# Patient Record
Sex: FEMALE | Race: WHITE | NOT HISPANIC OR LATINO
[De-identification: names, ages, dates, MRNs, and addresses within clinical notes are randomized per-mention and may not be internally consistent; named-entity substitution may affect disease eponyms.]

---

## 2017-01-27 ENCOUNTER — APPOINTMENT (OUTPATIENT)
Dept: INTERNAL MEDICINE | Facility: CLINIC | Age: 67
End: 2017-01-27

## 2017-01-27 ENCOUNTER — NON-APPOINTMENT (OUTPATIENT)
Age: 67
End: 2017-01-27

## 2017-01-27 VITALS
SYSTOLIC BLOOD PRESSURE: 160 MMHG | HEART RATE: 71 BPM | HEIGHT: 65.5 IN | RESPIRATION RATE: 14 BRPM | DIASTOLIC BLOOD PRESSURE: 80 MMHG | OXYGEN SATURATION: 99 %

## 2017-01-27 VITALS — DIASTOLIC BLOOD PRESSURE: 80 MMHG | SYSTOLIC BLOOD PRESSURE: 140 MMHG

## 2017-01-27 DIAGNOSIS — I49.9 CARDIAC ARRHYTHMIA, UNSPECIFIED: ICD-10-CM

## 2017-02-02 ENCOUNTER — NON-APPOINTMENT (OUTPATIENT)
Age: 67
End: 2017-02-02

## 2017-02-02 ENCOUNTER — APPOINTMENT (OUTPATIENT)
Dept: CARDIOLOGY | Facility: CLINIC | Age: 67
End: 2017-02-02

## 2017-02-02 VITALS
WEIGHT: 120 LBS | HEIGHT: 65 IN | SYSTOLIC BLOOD PRESSURE: 175 MMHG | HEART RATE: 75 BPM | DIASTOLIC BLOOD PRESSURE: 80 MMHG | BODY MASS INDEX: 19.99 KG/M2 | OXYGEN SATURATION: 100 %

## 2017-04-17 ENCOUNTER — APPOINTMENT (OUTPATIENT)
Dept: INTERNAL MEDICINE | Facility: CLINIC | Age: 67
End: 2017-04-17

## 2017-04-17 VITALS — SYSTOLIC BLOOD PRESSURE: 130 MMHG | DIASTOLIC BLOOD PRESSURE: 74 MMHG

## 2017-04-17 VITALS
OXYGEN SATURATION: 98 % | TEMPERATURE: 98.3 F | HEIGHT: 65 IN | RESPIRATION RATE: 14 BRPM | DIASTOLIC BLOOD PRESSURE: 68 MMHG | HEART RATE: 76 BPM | SYSTOLIC BLOOD PRESSURE: 138 MMHG

## 2017-05-31 ENCOUNTER — APPOINTMENT (OUTPATIENT)
Dept: MRI IMAGING | Facility: CLINIC | Age: 67
End: 2017-05-31

## 2017-05-31 ENCOUNTER — OUTPATIENT (OUTPATIENT)
Dept: OUTPATIENT SERVICES | Facility: HOSPITAL | Age: 67
LOS: 1 days | End: 2017-05-31
Payer: MEDICARE

## 2017-05-31 ENCOUNTER — APPOINTMENT (OUTPATIENT)
Dept: SPINE | Facility: CLINIC | Age: 67
End: 2017-05-31

## 2017-05-31 VITALS
BODY MASS INDEX: 19.75 KG/M2 | HEART RATE: 71 BPM | HEIGHT: 65.5 IN | WEIGHT: 120 LBS | SYSTOLIC BLOOD PRESSURE: 123 MMHG | DIASTOLIC BLOOD PRESSURE: 82 MMHG

## 2017-05-31 DIAGNOSIS — Z86.79 PERSONAL HISTORY OF OTHER DISEASES OF THE CIRCULATORY SYSTEM: ICD-10-CM

## 2017-05-31 DIAGNOSIS — M51.26 OTHER INTERVERTEBRAL DISC DISPLACEMENT, LUMBAR REGION: ICD-10-CM

## 2017-05-31 DIAGNOSIS — Z82.61 FAMILY HISTORY OF ARTHRITIS: ICD-10-CM

## 2017-05-31 DIAGNOSIS — Z78.9 OTHER SPECIFIED HEALTH STATUS: ICD-10-CM

## 2017-05-31 PROCEDURE — 72148 MRI LUMBAR SPINE W/O DYE: CPT

## 2017-05-31 RX ORDER — TRAMADOL HYDROCHLORIDE 50 MG/1
50 TABLET, COATED ORAL
Qty: 30 | Refills: 0 | Status: COMPLETED | COMMUNITY
Start: 2017-01-27 | End: 2017-05-31

## 2017-06-07 ENCOUNTER — APPOINTMENT (OUTPATIENT)
Dept: SPINE | Facility: CLINIC | Age: 67
End: 2017-06-07

## 2017-06-07 VITALS
HEART RATE: 83 BPM | SYSTOLIC BLOOD PRESSURE: 137 MMHG | HEIGHT: 65.5 IN | DIASTOLIC BLOOD PRESSURE: 85 MMHG | WEIGHT: 120 LBS | BODY MASS INDEX: 19.75 KG/M2

## 2017-06-07 DIAGNOSIS — M54.16 RADICULOPATHY, LUMBAR REGION: ICD-10-CM

## 2017-06-12 ENCOUNTER — APPOINTMENT (OUTPATIENT)
Dept: SPINE | Facility: CLINIC | Age: 67
End: 2017-06-12

## 2017-06-12 VITALS
HEART RATE: 71 BPM | WEIGHT: 120 LBS | BODY MASS INDEX: 19.75 KG/M2 | SYSTOLIC BLOOD PRESSURE: 112 MMHG | HEIGHT: 65.5 IN | DIASTOLIC BLOOD PRESSURE: 74 MMHG

## 2017-06-12 DIAGNOSIS — M54.16 RADICULOPATHY, LUMBAR REGION: ICD-10-CM

## 2017-06-15 ENCOUNTER — APPOINTMENT (OUTPATIENT)
Dept: RADIOLOGY | Facility: CLINIC | Age: 67
End: 2017-06-15

## 2017-06-15 ENCOUNTER — APPOINTMENT (OUTPATIENT)
Dept: CT IMAGING | Facility: CLINIC | Age: 67
End: 2017-06-15

## 2017-06-15 ENCOUNTER — OUTPATIENT (OUTPATIENT)
Dept: OUTPATIENT SERVICES | Facility: HOSPITAL | Age: 67
LOS: 1 days | End: 2017-06-15
Payer: MEDICARE

## 2017-06-15 DIAGNOSIS — M51.26 OTHER INTERVERTEBRAL DISC DISPLACEMENT, LUMBAR REGION: ICD-10-CM

## 2017-06-15 PROCEDURE — 72082 X-RAY EXAM ENTIRE SPI 2/3 VW: CPT

## 2017-06-15 PROCEDURE — 72131 CT LUMBAR SPINE W/O DYE: CPT

## 2017-06-26 ENCOUNTER — APPOINTMENT (OUTPATIENT)
Dept: SPINE | Facility: CLINIC | Age: 67
End: 2017-06-26

## 2017-06-26 VITALS
HEART RATE: 62 BPM | BODY MASS INDEX: 20.83 KG/M2 | HEIGHT: 65 IN | SYSTOLIC BLOOD PRESSURE: 154 MMHG | WEIGHT: 125 LBS | DIASTOLIC BLOOD PRESSURE: 89 MMHG

## 2017-07-10 ENCOUNTER — APPOINTMENT (OUTPATIENT)
Dept: INTERNAL MEDICINE | Facility: CLINIC | Age: 67
End: 2017-07-10

## 2017-07-10 ENCOUNTER — FORM ENCOUNTER (OUTPATIENT)
Age: 67
End: 2017-07-10

## 2017-07-10 VITALS
HEART RATE: 73 BPM | BODY MASS INDEX: 20.83 KG/M2 | WEIGHT: 125 LBS | RESPIRATION RATE: 14 BRPM | OXYGEN SATURATION: 98 % | DIASTOLIC BLOOD PRESSURE: 82 MMHG | HEIGHT: 65 IN | SYSTOLIC BLOOD PRESSURE: 128 MMHG | TEMPERATURE: 98 F

## 2017-07-10 DIAGNOSIS — M79.606 PAIN IN LEG, UNSPECIFIED: ICD-10-CM

## 2017-07-11 ENCOUNTER — OUTPATIENT (OUTPATIENT)
Dept: OUTPATIENT SERVICES | Facility: HOSPITAL | Age: 67
LOS: 1 days | End: 2017-07-11
Payer: MEDICARE

## 2017-07-11 ENCOUNTER — APPOINTMENT (OUTPATIENT)
Dept: RADIOLOGY | Facility: CLINIC | Age: 67
End: 2017-07-11

## 2017-07-11 DIAGNOSIS — M79.606 PAIN IN LEG, UNSPECIFIED: ICD-10-CM

## 2017-07-11 PROCEDURE — 73502 X-RAY EXAM HIP UNI 2-3 VIEWS: CPT

## 2017-07-11 PROCEDURE — 73502 X-RAY EXAM HIP UNI 2-3 VIEWS: CPT | Mod: 26,RT

## 2017-07-11 PROCEDURE — 73552 X-RAY EXAM OF FEMUR 2/>: CPT

## 2017-07-11 PROCEDURE — 73552 X-RAY EXAM OF FEMUR 2/>: CPT | Mod: 26,RT

## 2017-07-14 ENCOUNTER — OTHER (OUTPATIENT)
Age: 67
End: 2017-07-14

## 2017-08-09 ENCOUNTER — APPOINTMENT (OUTPATIENT)
Dept: INTERNAL MEDICINE | Facility: CLINIC | Age: 67
End: 2017-08-09
Payer: COMMERCIAL

## 2017-08-09 VITALS
SYSTOLIC BLOOD PRESSURE: 130 MMHG | WEIGHT: 121 LBS | HEART RATE: 64 BPM | BODY MASS INDEX: 20.16 KG/M2 | HEIGHT: 65 IN | DIASTOLIC BLOOD PRESSURE: 70 MMHG | OXYGEN SATURATION: 99 % | TEMPERATURE: 98.1 F | RESPIRATION RATE: 14 BRPM
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
## 2017-08-09 DIAGNOSIS — M79.606 PAIN IN LEG, UNSPECIFIED: ICD-10-CM

## 2017-08-09 PROCEDURE — 99214 OFFICE O/P EST MOD 30 MIN: CPT

## 2017-09-12 ENCOUNTER — OTHER (OUTPATIENT)
Age: 67
End: 2017-09-12

## 2017-09-13 ENCOUNTER — OTHER (OUTPATIENT)
Age: 67
End: 2017-09-13

## 2017-10-09 ENCOUNTER — APPOINTMENT (OUTPATIENT)
Dept: INTERNAL MEDICINE | Facility: CLINIC | Age: 67
End: 2017-10-09
Payer: MEDICARE

## 2017-10-09 VITALS — SYSTOLIC BLOOD PRESSURE: 130 MMHG | DIASTOLIC BLOOD PRESSURE: 70 MMHG

## 2017-10-09 VITALS
HEART RATE: 78 BPM | BODY MASS INDEX: 20.16 KG/M2 | DIASTOLIC BLOOD PRESSURE: 80 MMHG | HEIGHT: 65 IN | SYSTOLIC BLOOD PRESSURE: 140 MMHG | WEIGHT: 121 LBS | RESPIRATION RATE: 14 BRPM | TEMPERATURE: 98.2 F | OXYGEN SATURATION: 97 %

## 2017-10-09 PROCEDURE — 90686 IIV4 VACC NO PRSV 0.5 ML IM: CPT

## 2017-10-09 PROCEDURE — 99214 OFFICE O/P EST MOD 30 MIN: CPT | Mod: 25

## 2017-10-09 PROCEDURE — G0008: CPT

## 2017-10-30 ENCOUNTER — APPOINTMENT (OUTPATIENT)
Dept: INTERNAL MEDICINE | Facility: CLINIC | Age: 67
End: 2017-10-30
Payer: MEDICARE

## 2017-10-30 VITALS
TEMPERATURE: 97.8 F | OXYGEN SATURATION: 98 % | RESPIRATION RATE: 14 BRPM | HEIGHT: 65 IN | DIASTOLIC BLOOD PRESSURE: 66 MMHG | HEART RATE: 91 BPM | BODY MASS INDEX: 20.16 KG/M2 | SYSTOLIC BLOOD PRESSURE: 116 MMHG | WEIGHT: 121 LBS

## 2017-10-30 DIAGNOSIS — M25.559 PAIN IN UNSPECIFIED HIP: ICD-10-CM

## 2017-10-30 PROCEDURE — 99214 OFFICE O/P EST MOD 30 MIN: CPT | Mod: 25

## 2017-10-30 PROCEDURE — 90736 HZV VACCINE LIVE SUBQ: CPT | Mod: GY

## 2017-10-30 PROCEDURE — 90471 IMMUNIZATION ADMIN: CPT

## 2017-11-04 ENCOUNTER — FORM ENCOUNTER (OUTPATIENT)
Age: 67
End: 2017-11-04

## 2017-11-05 ENCOUNTER — OUTPATIENT (OUTPATIENT)
Dept: OUTPATIENT SERVICES | Facility: HOSPITAL | Age: 67
LOS: 1 days | End: 2017-11-05
Payer: COMMERCIAL

## 2017-11-05 ENCOUNTER — APPOINTMENT (OUTPATIENT)
Dept: MRI IMAGING | Facility: CLINIC | Age: 67
End: 2017-11-05
Payer: MEDICARE

## 2017-11-05 DIAGNOSIS — M25.559 PAIN IN UNSPECIFIED HIP: ICD-10-CM

## 2017-11-05 PROCEDURE — 72148 MRI LUMBAR SPINE W/O DYE: CPT | Mod: 26

## 2017-11-05 PROCEDURE — 72195 MRI PELVIS W/O DYE: CPT | Mod: 26

## 2017-11-05 PROCEDURE — 72148 MRI LUMBAR SPINE W/O DYE: CPT

## 2017-11-05 PROCEDURE — 72195 MRI PELVIS W/O DYE: CPT

## 2017-11-07 ENCOUNTER — OTHER (OUTPATIENT)
Age: 67
End: 2017-11-07

## 2017-11-08 DIAGNOSIS — M51.36 OTHER INTERVERTEBRAL DISC DEGENERATION, LUMBAR REGION: ICD-10-CM

## 2017-11-08 DIAGNOSIS — M25.451 EFFUSION, RIGHT HIP: ICD-10-CM

## 2017-11-08 DIAGNOSIS — M25.551 PAIN IN RIGHT HIP: ICD-10-CM

## 2017-11-08 DIAGNOSIS — M51.27 OTHER INTERVERTEBRAL DISC DISPLACEMENT, LUMBOSACRAL REGION: ICD-10-CM

## 2017-12-05 ENCOUNTER — EMERGENCY (EMERGENCY)
Facility: HOSPITAL | Age: 67
LOS: 1 days | Discharge: HOME | End: 2017-12-05
Attending: EMERGENCY MEDICINE | Admitting: EMERGENCY MEDICINE
Payer: MEDICARE

## 2017-12-05 VITALS — WEIGHT: 123.9 LBS | HEIGHT: 65 IN

## 2017-12-05 VITALS
HEART RATE: 68 BPM | OXYGEN SATURATION: 98 % | SYSTOLIC BLOOD PRESSURE: 130 MMHG | RESPIRATION RATE: 16 BRPM | DIASTOLIC BLOOD PRESSURE: 77 MMHG

## 2017-12-05 DIAGNOSIS — Z96.643 PRESENCE OF ARTIFICIAL HIP JOINT, BILATERAL: Chronic | ICD-10-CM

## 2017-12-05 LAB
ALBUMIN SERPL ELPH-MCNC: 3.7 G/DL — SIGNIFICANT CHANGE UP (ref 3.3–5)
ALP SERPL-CCNC: 79 U/L — SIGNIFICANT CHANGE UP (ref 40–120)
ALT FLD-CCNC: 44 U/L — SIGNIFICANT CHANGE UP (ref 12–78)
AMYLASE P1 CFR SERPL: 177 U/L — HIGH (ref 25–115)
ANION GAP SERPL CALC-SCNC: 7 MMOL/L — SIGNIFICANT CHANGE UP (ref 5–17)
APPEARANCE UR: CLEAR — SIGNIFICANT CHANGE UP
AST SERPL-CCNC: 34 U/L — SIGNIFICANT CHANGE UP (ref 15–37)
BILIRUB SERPL-MCNC: 0.5 MG/DL — SIGNIFICANT CHANGE UP (ref 0.2–1.2)
BILIRUB UR-MCNC: NEGATIVE — SIGNIFICANT CHANGE UP
BUN SERPL-MCNC: 32 MG/DL — HIGH (ref 7–23)
CALCIUM SERPL-MCNC: 8.6 MG/DL — SIGNIFICANT CHANGE UP (ref 8.5–10.1)
CHLORIDE SERPL-SCNC: 104 MMOL/L — SIGNIFICANT CHANGE UP (ref 96–108)
CO2 SERPL-SCNC: 27 MMOL/L — SIGNIFICANT CHANGE UP (ref 22–31)
COLOR SPEC: YELLOW — SIGNIFICANT CHANGE UP
CREAT SERPL-MCNC: 1.5 MG/DL — HIGH (ref 0.5–1.3)
DIFF PNL FLD: ABNORMAL
GLUCOSE SERPL-MCNC: 121 MG/DL — HIGH (ref 70–99)
GLUCOSE UR QL: NEGATIVE — SIGNIFICANT CHANGE UP
HCT VFR BLD CALC: 35.7 % — SIGNIFICANT CHANGE UP (ref 34.5–45)
HGB BLD-MCNC: 11.5 G/DL — SIGNIFICANT CHANGE UP (ref 11.5–15.5)
KETONES UR-MCNC: NEGATIVE — SIGNIFICANT CHANGE UP
LEUKOCYTE ESTERASE UR-ACNC: ABNORMAL
LIDOCAIN IGE QN: 254 U/L — SIGNIFICANT CHANGE UP (ref 73–393)
MCHC RBC-ENTMCNC: 31.3 PG — SIGNIFICANT CHANGE UP (ref 27–34)
MCHC RBC-ENTMCNC: 32.2 GM/DL — SIGNIFICANT CHANGE UP (ref 32–36)
MCV RBC AUTO: 97.2 FL — SIGNIFICANT CHANGE UP (ref 80–100)
NITRITE UR-MCNC: NEGATIVE — SIGNIFICANT CHANGE UP
PH UR: 5 — SIGNIFICANT CHANGE UP (ref 5–8)
PLATELET # BLD AUTO: 189 K/UL — SIGNIFICANT CHANGE UP (ref 150–400)
POTASSIUM SERPL-MCNC: 4 MMOL/L — SIGNIFICANT CHANGE UP (ref 3.5–5.3)
POTASSIUM SERPL-SCNC: 4 MMOL/L — SIGNIFICANT CHANGE UP (ref 3.5–5.3)
PROT SERPL-MCNC: 6.9 G/DL — SIGNIFICANT CHANGE UP (ref 6–8.3)
PROT UR-MCNC: NEGATIVE — SIGNIFICANT CHANGE UP
RBC # BLD: 3.67 M/UL — LOW (ref 3.8–5.2)
RBC # FLD: 11 % — SIGNIFICANT CHANGE UP (ref 10.3–14.5)
SODIUM SERPL-SCNC: 138 MMOL/L — SIGNIFICANT CHANGE UP (ref 135–145)
SP GR SPEC: 1.01 — SIGNIFICANT CHANGE UP (ref 1.01–1.02)
UROBILINOGEN FLD QL: NEGATIVE — SIGNIFICANT CHANGE UP
WBC # BLD: 7.8 K/UL — SIGNIFICANT CHANGE UP (ref 3.8–10.5)
WBC # FLD AUTO: 7.8 K/UL — SIGNIFICANT CHANGE UP (ref 3.8–10.5)

## 2017-12-05 PROCEDURE — 80053 COMPREHEN METABOLIC PANEL: CPT

## 2017-12-05 PROCEDURE — 70450 CT HEAD/BRAIN W/O DYE: CPT | Mod: 26

## 2017-12-05 PROCEDURE — 74176 CT ABD & PELVIS W/O CONTRAST: CPT | Mod: 26

## 2017-12-05 PROCEDURE — 99285 EMERGENCY DEPT VISIT HI MDM: CPT

## 2017-12-05 PROCEDURE — 81001 URINALYSIS AUTO W/SCOPE: CPT

## 2017-12-05 PROCEDURE — 70450 CT HEAD/BRAIN W/O DYE: CPT

## 2017-12-05 PROCEDURE — 85027 COMPLETE CBC AUTOMATED: CPT

## 2017-12-05 PROCEDURE — 83690 ASSAY OF LIPASE: CPT

## 2017-12-05 PROCEDURE — 82150 ASSAY OF AMYLASE: CPT

## 2017-12-05 PROCEDURE — 93005 ELECTROCARDIOGRAM TRACING: CPT

## 2017-12-05 PROCEDURE — 74176 CT ABD & PELVIS W/O CONTRAST: CPT

## 2017-12-05 PROCEDURE — 99284 EMERGENCY DEPT VISIT MOD MDM: CPT | Mod: 25

## 2017-12-05 RX ORDER — LOSARTAN POTASSIUM 100 MG/1
1 TABLET, FILM COATED ORAL
Qty: 0 | Refills: 0 | COMMUNITY

## 2017-12-05 RX ORDER — IOHEXOL 300 MG/ML
30 INJECTION, SOLUTION INTRAVENOUS ONCE
Qty: 0 | Refills: 0 | Status: COMPLETED | OUTPATIENT
Start: 2017-12-05 | End: 2017-12-05

## 2017-12-05 RX ORDER — SODIUM CHLORIDE 9 MG/ML
1000 INJECTION INTRAMUSCULAR; INTRAVENOUS; SUBCUTANEOUS ONCE
Qty: 0 | Refills: 0 | Status: COMPLETED | OUTPATIENT
Start: 2017-12-05 | End: 2017-12-05

## 2017-12-05 RX ADMIN — IOHEXOL 30 MILLILITER(S): 300 INJECTION, SOLUTION INTRAVENOUS at 12:21

## 2017-12-05 RX ADMIN — SODIUM CHLORIDE 2000 MILLILITER(S): 9 INJECTION INTRAMUSCULAR; INTRAVENOUS; SUBCUTANEOUS at 10:47

## 2017-12-05 NOTE — ED ADULT NURSE NOTE - CADM POA CENTRAL LINE
Alert & oriented; no sensory, motor or coordination deficits, normal reflexes No negative detailed exam

## 2017-12-05 NOTE — ED PROVIDER NOTE - MEDICAL DECISION MAKING DETAILS
Abdominal cramps followed by nausea/dizziness and syncope requiring evaluation, labs and ct scan as well as ecg and cardiology consultation with Dr. Lozada

## 2017-12-05 NOTE — ED PROVIDER NOTE - OBJECTIVE STATEMENT
68 yo F PMHx HTN, arthritis presents after syncopal episode at home. Pt's daughter and son-in-law are at bedside who provide history due to witnessing events and patient not being able to recall events. As per pt's daughter, she found her mother "passed out" in the bedroom, with pale skin. Pt's daughter went to get help and told her mother to stay in bed. When pt's other daughter and son-law returned to the bedroom, they found her mother slouched over, pale, diaphoretic, and "convulsing" on the toilet. Son in law then picked up the pt and she regained consciousness about 30 seconds later. Pt states that the last events that she remembers is not feeling well after taking a shower this morning. Pt states that she had upper abdominal pain and dizziness and felt like she might "pass out." After the event, pt stated that she felt nauseous. Denies previous similar episodes. Reports the upper abdominal pain being present for the past week. Denies fevers, chills, changes in stool coloring, constipation, diarrhea, vomiting, numbness, tingling, headache, CP, palpitations, SOB. Pt had last colonoscopy performed 5 years ago and was told she has polyps. Pt takes Tylenol frequently for her arthritis.

## 2017-12-05 NOTE — CONSULT NOTE ADULT - ASSESSMENT
Pt is a 66 y/o f pmhx arthritis of the back and HTN presents to the ED after two episodes of syncope this morning possibly vasovagal in nature 2/2 pain from pancreatitis.     Recommend:    - Unlikely cardiac in nature with normal EKG, asymptomatic  - No evidence of acute ischemia  - No evidence of volume overload  - Elevated amylase, f/u CT abdomen and pelvis  - May need ECHO  - BP well controlled with home Losartan, however in the setting of KAYY will need to hold for now, should also hold NSAIDS  - Monitor and replete lytes, keep K>4, Mg<2  - Other cardiovascular workup will depend on clinical course  - All other workup as per primary team  - Will follow Pt is a 66 y/o f pmhx arthritis of the back and HTN presents to the ED after two episodes of syncope this morning possibly vasovagal in nature 2/2 pain from pancreatitis.     Recommend:    - Unlikely cardiac in nature with normal EKG, asymptomatic  - No evidence of acute ischemia  - No evidence of volume overload  - Elevated amylase, f/u CT abdomen and pelvis  - BP well controlled with home Losartan, however in the setting of KAYY will need to hold for now, should also hold NSAIDS  - Monitor and replete lytes, keep K>4, Mg<2  - Should f/u with Dr. Singh for outpatient ECHO and carotid dopplers  - All other workup as per primary team  - Pt is cleared for discharge from a cardiac standpoint Pt is a 66 y/o f pmhx arthritis of the back and HTN presents to the ED after two episodes of syncope this morning possibly vasovagal in nature 2/2 pain from pancreatitis.     Recommend:    - syncope seems vagal.    - No evidence of acute ischemia  - No evidence of volume overload  - Elevated amylase, f/u CT abdomen and pelvis  - BP well controlled with home Losartan, however in the setting of KAYY will need to hold for now, should also hold NSAIDS  - Monitor and replete lytes, keep K>4, Mg<2  - Should f/u with Dr. Singh for outpatient ECHO and carotid dopplers  - All other workup as per primary team  - Pt is cleared for discharge from a cardiac standpoint

## 2017-12-05 NOTE — ED PROVIDER NOTE - FAMILY HISTORY
Father  Still living? Unknown  Family history of kidney cancer, Age at diagnosis: Age Unknown     Mother  Still living? Unknown  Family history of kidney cancer, Age at diagnosis: Age Unknown

## 2017-12-05 NOTE — ED PROVIDER NOTE - ATTENDING CONTRIBUTION TO CARE
Mid abdominal pain with nausea, dizziness and then syncope. Exam revealed female in NAD with normal lungs and heart but with mild epigastric tenderness to deep palpation. I agree with plan and management outlined by R-1.

## 2017-12-08 ENCOUNTER — APPOINTMENT (OUTPATIENT)
Dept: INTERNAL MEDICINE | Facility: CLINIC | Age: 67
End: 2017-12-08
Payer: MEDICARE

## 2017-12-08 VITALS
RESPIRATION RATE: 14 BRPM | HEIGHT: 65 IN | SYSTOLIC BLOOD PRESSURE: 138 MMHG | DIASTOLIC BLOOD PRESSURE: 82 MMHG | TEMPERATURE: 97.8 F | HEART RATE: 82 BPM | WEIGHT: 119 LBS | OXYGEN SATURATION: 98 % | BODY MASS INDEX: 19.83 KG/M2

## 2017-12-08 DIAGNOSIS — R10.84 GENERALIZED ABDOMINAL PAIN: ICD-10-CM

## 2017-12-08 LAB
AMYLASE/CREAT SERPL: 106 U/L
LPL SERPL-CCNC: 62 U/L

## 2017-12-08 PROCEDURE — 99214 OFFICE O/P EST MOD 30 MIN: CPT | Mod: 25

## 2017-12-08 PROCEDURE — 36415 COLL VENOUS BLD VENIPUNCTURE: CPT

## 2017-12-09 LAB
ALBUMIN SERPL ELPH-MCNC: 4.3 G/DL
ALP BLD-CCNC: 76 U/L
ALT SERPL-CCNC: 29 U/L
ANION GAP SERPL CALC-SCNC: 15 MMOL/L
AST SERPL-CCNC: 28 U/L
BASOPHILS # BLD AUTO: 0.04 K/UL
BASOPHILS NFR BLD AUTO: 0.5 %
BILIRUB SERPL-MCNC: 0.4 MG/DL
BUN SERPL-MCNC: 25 MG/DL
CALCIUM SERPL-MCNC: 9.9 MG/DL
CHLORIDE SERPL-SCNC: 100 MMOL/L
CO2 SERPL-SCNC: 23 MMOL/L
CREAT SERPL-MCNC: 1.28 MG/DL
EOSINOPHIL # BLD AUTO: 0.27 K/UL
EOSINOPHIL NFR BLD AUTO: 3.3 %
GLUCOSE SERPL-MCNC: 83 MG/DL
HCT VFR BLD CALC: 34.1 %
HGB BLD-MCNC: 11.3 G/DL
IMM GRANULOCYTES NFR BLD AUTO: 0 %
LYMPHOCYTES # BLD AUTO: 2.28 K/UL
LYMPHOCYTES NFR BLD AUTO: 27.9 %
MAN DIFF?: NORMAL
MCHC RBC-ENTMCNC: 31.7 PG
MCHC RBC-ENTMCNC: 33.1 GM/DL
MCV RBC AUTO: 95.8 FL
MONOCYTES # BLD AUTO: 0.71 K/UL
MONOCYTES NFR BLD AUTO: 8.7 %
NEUTROPHILS # BLD AUTO: 4.87 K/UL
NEUTROPHILS NFR BLD AUTO: 59.6 %
PLATELET # BLD AUTO: 216 K/UL
POTASSIUM SERPL-SCNC: 5 MMOL/L
PROT SERPL-MCNC: 7.9 G/DL
RBC # BLD: 3.56 M/UL
RBC # FLD: 12.4 %
SODIUM SERPL-SCNC: 138 MMOL/L
WBC # FLD AUTO: 8.17 K/UL

## 2017-12-11 ENCOUNTER — FORM ENCOUNTER (OUTPATIENT)
Age: 67
End: 2017-12-11

## 2017-12-11 ENCOUNTER — TRANSCRIPTION ENCOUNTER (OUTPATIENT)
Age: 67
End: 2017-12-11

## 2017-12-12 ENCOUNTER — OUTPATIENT (OUTPATIENT)
Dept: OUTPATIENT SERVICES | Facility: HOSPITAL | Age: 67
LOS: 1 days | End: 2017-12-12
Payer: MEDICARE

## 2017-12-12 ENCOUNTER — APPOINTMENT (OUTPATIENT)
Dept: CT IMAGING | Facility: CLINIC | Age: 67
End: 2017-12-12
Payer: MEDICARE

## 2017-12-12 DIAGNOSIS — Z96.643 PRESENCE OF ARTIFICIAL HIP JOINT, BILATERAL: Chronic | ICD-10-CM

## 2017-12-12 DIAGNOSIS — Z00.8 ENCOUNTER FOR OTHER GENERAL EXAMINATION: ICD-10-CM

## 2017-12-12 PROCEDURE — 74177 CT ABD & PELVIS W/CONTRAST: CPT | Mod: 26

## 2017-12-12 PROCEDURE — 74177 CT ABD & PELVIS W/CONTRAST: CPT

## 2017-12-29 ENCOUNTER — CHART COPY (OUTPATIENT)
Age: 67
End: 2017-12-29

## 2018-03-15 ENCOUNTER — RX RENEWAL (OUTPATIENT)
Age: 68
End: 2018-03-15

## 2018-03-30 ENCOUNTER — APPOINTMENT (OUTPATIENT)
Dept: INTERNAL MEDICINE | Facility: CLINIC | Age: 68
End: 2018-03-30
Payer: MEDICARE

## 2018-03-30 VITALS
OXYGEN SATURATION: 98 % | HEART RATE: 75 BPM | DIASTOLIC BLOOD PRESSURE: 80 MMHG | RESPIRATION RATE: 14 BRPM | HEIGHT: 65 IN | TEMPERATURE: 98.3 F | SYSTOLIC BLOOD PRESSURE: 130 MMHG | WEIGHT: 124 LBS | BODY MASS INDEX: 20.66 KG/M2

## 2018-03-30 PROCEDURE — 99214 OFFICE O/P EST MOD 30 MIN: CPT

## 2018-04-23 ENCOUNTER — RX RENEWAL (OUTPATIENT)
Age: 68
End: 2018-04-23

## 2018-06-12 ENCOUNTER — RX RENEWAL (OUTPATIENT)
Age: 68
End: 2018-06-12

## 2018-09-07 PROBLEM — I10 ESSENTIAL (PRIMARY) HYPERTENSION: Chronic | Status: ACTIVE | Noted: 2017-12-05

## 2018-09-07 PROBLEM — M19.90 UNSPECIFIED OSTEOARTHRITIS, UNSPECIFIED SITE: Chronic | Status: ACTIVE | Noted: 2017-12-05

## 2018-09-10 ENCOUNTER — APPOINTMENT (OUTPATIENT)
Dept: INTERNAL MEDICINE | Facility: CLINIC | Age: 68
End: 2018-09-10
Payer: MEDICARE

## 2018-09-10 VITALS
BODY MASS INDEX: 20.16 KG/M2 | DIASTOLIC BLOOD PRESSURE: 70 MMHG | WEIGHT: 121 LBS | HEART RATE: 63 BPM | HEIGHT: 65 IN | OXYGEN SATURATION: 98 % | RESPIRATION RATE: 14 BRPM | SYSTOLIC BLOOD PRESSURE: 140 MMHG | TEMPERATURE: 98.1 F

## 2018-09-10 VITALS — SYSTOLIC BLOOD PRESSURE: 136 MMHG | DIASTOLIC BLOOD PRESSURE: 80 MMHG

## 2018-09-10 PROCEDURE — 99214 OFFICE O/P EST MOD 30 MIN: CPT | Mod: 25

## 2018-09-10 PROCEDURE — 36415 COLL VENOUS BLD VENIPUNCTURE: CPT

## 2018-09-10 RX ORDER — AMOXICILLIN AND CLAVULANATE POTASSIUM 875; 125 MG/1; MG/1
875-125 TABLET, COATED ORAL
Qty: 20 | Refills: 0 | Status: DISCONTINUED | COMMUNITY
Start: 2017-12-08 | End: 2018-09-10

## 2018-09-10 NOTE — PHYSICAL EXAM

## 2018-10-12 ENCOUNTER — APPOINTMENT (OUTPATIENT)
Dept: INTERNAL MEDICINE | Facility: CLINIC | Age: 68
End: 2018-10-12
Payer: MEDICARE

## 2018-10-12 VITALS
TEMPERATURE: 98.6 F | DIASTOLIC BLOOD PRESSURE: 80 MMHG | HEART RATE: 65 BPM | SYSTOLIC BLOOD PRESSURE: 130 MMHG | RESPIRATION RATE: 14 BRPM | OXYGEN SATURATION: 99 % | BODY MASS INDEX: 20.16 KG/M2 | WEIGHT: 121 LBS | HEIGHT: 65 IN

## 2018-10-12 PROCEDURE — 90662 IIV NO PRSV INCREASED AG IM: CPT

## 2018-10-12 PROCEDURE — 99214 OFFICE O/P EST MOD 30 MIN: CPT | Mod: 25

## 2018-10-12 PROCEDURE — G0008: CPT

## 2018-10-15 ENCOUNTER — FORM ENCOUNTER (OUTPATIENT)
Age: 68
End: 2018-10-15

## 2018-10-16 ENCOUNTER — OUTPATIENT (OUTPATIENT)
Dept: OUTPATIENT SERVICES | Facility: HOSPITAL | Age: 68
LOS: 1 days | End: 2018-10-16
Payer: MEDICARE

## 2018-10-16 ENCOUNTER — APPOINTMENT (OUTPATIENT)
Dept: MAMMOGRAPHY | Facility: CLINIC | Age: 68
End: 2018-10-16
Payer: MEDICARE

## 2018-10-16 DIAGNOSIS — Z96.643 PRESENCE OF ARTIFICIAL HIP JOINT, BILATERAL: Chronic | ICD-10-CM

## 2018-10-16 DIAGNOSIS — Z00.8 ENCOUNTER FOR OTHER GENERAL EXAMINATION: ICD-10-CM

## 2018-10-16 PROCEDURE — 77067 SCR MAMMO BI INCL CAD: CPT | Mod: 26

## 2018-10-16 PROCEDURE — 77063 BREAST TOMOSYNTHESIS BI: CPT | Mod: 26

## 2018-10-16 PROCEDURE — 77063 BREAST TOMOSYNTHESIS BI: CPT

## 2018-10-16 PROCEDURE — 77067 SCR MAMMO BI INCL CAD: CPT

## 2019-01-07 ENCOUNTER — RX RENEWAL (OUTPATIENT)
Age: 69
End: 2019-01-07

## 2019-01-29 ENCOUNTER — RX RENEWAL (OUTPATIENT)
Age: 69
End: 2019-01-29

## 2019-03-08 ENCOUNTER — RX RENEWAL (OUTPATIENT)
Age: 69
End: 2019-03-08

## 2019-03-09 ENCOUNTER — RX RENEWAL (OUTPATIENT)
Age: 69
End: 2019-03-09

## 2019-03-11 ENCOUNTER — APPOINTMENT (OUTPATIENT)
Dept: INTERNAL MEDICINE | Facility: CLINIC | Age: 69
End: 2019-03-11
Payer: MEDICARE

## 2019-03-11 VITALS
HEIGHT: 65 IN | WEIGHT: 121 LBS | HEART RATE: 74 BPM | RESPIRATION RATE: 14 BRPM | OXYGEN SATURATION: 98 % | SYSTOLIC BLOOD PRESSURE: 140 MMHG | TEMPERATURE: 98 F | DIASTOLIC BLOOD PRESSURE: 70 MMHG | BODY MASS INDEX: 20.16 KG/M2

## 2019-03-11 VITALS — SYSTOLIC BLOOD PRESSURE: 130 MMHG | DIASTOLIC BLOOD PRESSURE: 70 MMHG

## 2019-03-11 DIAGNOSIS — M54.6 PAIN IN THORACIC SPINE: ICD-10-CM

## 2019-03-11 PROCEDURE — 99214 OFFICE O/P EST MOD 30 MIN: CPT

## 2019-03-19 ENCOUNTER — APPOINTMENT (OUTPATIENT)
Dept: CARDIOLOGY | Facility: CLINIC | Age: 69
End: 2019-03-19
Payer: MEDICARE

## 2019-03-19 ENCOUNTER — NON-APPOINTMENT (OUTPATIENT)
Age: 69
End: 2019-03-19

## 2019-03-19 VITALS
DIASTOLIC BLOOD PRESSURE: 74 MMHG | HEART RATE: 76 BPM | HEIGHT: 65 IN | SYSTOLIC BLOOD PRESSURE: 126 MMHG | BODY MASS INDEX: 19.99 KG/M2 | WEIGHT: 120 LBS | OXYGEN SATURATION: 99 %

## 2019-03-19 PROCEDURE — 99214 OFFICE O/P EST MOD 30 MIN: CPT

## 2019-03-19 PROCEDURE — 93000 ELECTROCARDIOGRAM COMPLETE: CPT

## 2019-03-19 NOTE — DISCUSSION/SUMMARY
[FreeTextEntry1] : This is 68-year-old white female with a history of hypertension controlled on losartan who presents with symptoms of shortness of breath and palpitations that occur randomly and are not related specifically to to physical exertion. They typically last a few minutes and then resolved. They do not occur every day.\par \par As possible patient is having a cardiac arrhythmia. She'll go for general blood work to include a fasting lipid profile, chemistries, and thyroid. She'll have an echocardiogram to check on the heart structurally and wear a 30 day event monitor to try to record these episodes as they occur. Pending results of the study she may require stress test.\par \par It is possible that all her symptoms may be stress induced. This was discussed with the patient answered her questions.

## 2019-03-19 NOTE — PHYSICAL EXAM
[General Appearance - Well Developed] : well developed [Normal Appearance] : normal appearance [Well Groomed] : well groomed [General Appearance - Well Nourished] : well nourished [No Deformities] : no deformities [Normal Conjunctiva] : the conjunctiva exhibited no abnormalities [General Appearance - In No Acute Distress] : no acute distress [Normal Oral Mucosa] : normal oral mucosa [Normal Jugular Venous A Waves Present] : normal jugular venous A waves present [Normal Jugular Venous V Waves Present] : normal jugular venous V waves present [No Jugular Venous Mccollum A Waves] : no jugular venous mccollum A waves [Exaggerated Use Of Accessory Muscles For Inspiration] : no accessory muscle use [Respiration, Rhythm And Depth] : normal respiratory rhythm and effort [Auscultation Breath Sounds / Voice Sounds] : lungs were clear to auscultation bilaterally [Abdomen Soft] : soft [Bowel Sounds] : normal bowel sounds [Abnormal Walk] : normal gait [Abdomen Tenderness] : non-tender [Gait - Sufficient For Exercise Testing] : the gait was sufficient for exercise testing [Nail Clubbing] : no clubbing of the fingernails [Cyanosis, Localized] : no localized cyanosis [Skin Color & Pigmentation] : normal skin color and pigmentation [Skin Turgor] : normal skin turgor [] : no rash [Oriented To Time, Place, And Person] : oriented to person, place, and time [Impaired Insight] : insight and judgment were intact [No Anxiety] : not feeling anxious [Normal S1] : normal S1 [Normal Rate] : normal [Normal S2] : normal S2 [No Murmur] : no murmurs heard [2+] : left 2+ [1+] : left 1+ [No Abnormalities] : the abdominal aorta was not enlarged and no bruit was heard [No Pitting Edema] : no pitting edema present [S4] : no S4 [S3] : no S3 [Right Carotid Bruit] : no bruit heard over the right carotid [Left Carotid Bruit] : no bruit heard over the left carotid [Left Femoral Bruit] : no bruit heard over the left femoral artery [Right Femoral Bruit] : no bruit heard over the right femoral artery

## 2019-03-19 NOTE — HISTORY OF PRESENT ILLNESS
[FreeTextEntry1] : I saw Vikki Tellez in the office today for a cardiac follow up. 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 She is status post hip replacement surgery and doing well.\par \par She has a history of hypertension on losartan.  She is limited in exercise because of back and hip problems. She walks regularly and denies any exertional symptoms. She's been under a lot of stress recently. She is taking care of her ex- who is on hospice. She notes symptoms of shortness of breath and palpitation. These symptoms come and go randomly. Palpitations feel like a rapid heartbeat. She has no chest discomfort.\par \par She had a stress test 10/15 that showed no ischemia to a workload of 10 Mets. Most recent blood work performed 12/17 demonstrated a mild anemia. Hemoglobin 11.3. In 2016 showed a cholesterol of 183, triglycerides 63, HDL 72, and LDL 98.\par \par A resting 12-lead electrocardiogram demonstrates sinus rhythm and is unchanged.\par \par  She has no family history of heart disease and does not smoke.\par

## 2019-03-19 NOTE — REVIEW OF SYSTEMS
[Feeling Fatigued] : feeling fatigued [Chest Pain] : chest pain [Joint Pain] : joint pain [Hand Pain] : hand pain [Foot Pain] : foot pain [Dizziness] : dizziness [Negative] : Genitourinary [Headache] : no headache [Blurry Vision] : no blurred vision [Earache] : no earache [Seeing Double (Diplopia)] : no diplopia [Sore Throat] : no sore throat [Sinus Pressure] : no sinus pressure [Shortness Of Breath] : no shortness of breath [Skin: A Rash] : no rash: [Palpitations] : no palpitations [Depression] : no depression [Anxiety] : no anxiety [Excessive Thirst] : no polydipsia [Easy Bleeding] : no tendency for easy bleeding [Easy Bruising] : no tendency for easy bruising

## 2019-04-02 ENCOUNTER — APPOINTMENT (OUTPATIENT)
Dept: CARDIOLOGY | Facility: CLINIC | Age: 69
End: 2019-04-02
Payer: MEDICARE

## 2019-04-02 PROCEDURE — 93306 TTE W/DOPPLER COMPLETE: CPT

## 2019-06-06 ENCOUNTER — RX RENEWAL (OUTPATIENT)
Age: 69
End: 2019-06-06

## 2019-07-01 ENCOUNTER — MEDICATION RENEWAL (OUTPATIENT)
Age: 69
End: 2019-07-01

## 2019-07-12 ENCOUNTER — APPOINTMENT (OUTPATIENT)
Dept: INTERNAL MEDICINE | Facility: CLINIC | Age: 69
End: 2019-07-12
Payer: MEDICARE

## 2019-07-12 VITALS
OXYGEN SATURATION: 99 % | WEIGHT: 111 LBS | DIASTOLIC BLOOD PRESSURE: 80 MMHG | TEMPERATURE: 98 F | BODY MASS INDEX: 18.49 KG/M2 | HEIGHT: 65 IN | HEART RATE: 76 BPM | RESPIRATION RATE: 14 BRPM | SYSTOLIC BLOOD PRESSURE: 150 MMHG

## 2019-07-12 VITALS — DIASTOLIC BLOOD PRESSURE: 80 MMHG | SYSTOLIC BLOOD PRESSURE: 130 MMHG

## 2019-07-12 PROCEDURE — 99214 OFFICE O/P EST MOD 30 MIN: CPT

## 2019-07-12 NOTE — PHYSICAL EXAM
[No Acute Distress] : no acute distress [Well Nourished] : well nourished [Well Developed] : well developed [Well-Appearing] : well-appearing [Normal Sclera/Conjunctiva] : normal sclera/conjunctiva [EOMI] : extraocular movements intact [PERRL] : pupils equal round and reactive to light [Normal Outer Ear/Nose] : the outer ears and nose were normal in appearance [Normal Oropharynx] : the oropharynx was normal [No JVD] : no jugular venous distention [No Lymphadenopathy] : no lymphadenopathy [Supple] : supple [Thyroid Normal, No Nodules] : the thyroid was normal and there were no nodules present [No Respiratory Distress] : no respiratory distress  [No Accessory Muscle Use] : no accessory muscle use [Clear to Auscultation] : lungs were clear to auscultation bilaterally [Normal Rate] : normal rate  [Normal S1, S2] : normal S1 and S2 [Regular Rhythm] : with a regular rhythm [No Murmur] : no murmur heard [No Carotid Bruits] : no carotid bruits [No Abdominal Bruit] : a ~M bruit was not heard ~T in the abdomen [No Varicosities] : no varicosities [Pedal Pulses Present] : the pedal pulses are present [No Edema] : there was no peripheral edema [No Palpable Aorta] : no palpable aorta [No Extremity Clubbing/Cyanosis] : no extremity clubbing/cyanosis [Soft] : abdomen soft [Non Tender] : non-tender [Non-distended] : non-distended [No Masses] : no abdominal mass palpated [No HSM] : no HSM [Normal Bowel Sounds] : normal bowel sounds [Normal Supraclavicular Nodes] : no supraclavicular lymphadenopathy [Normal Posterior Cervical Nodes] : no posterior cervical lymphadenopathy [Normal Anterior Cervical Nodes] : no anterior cervical lymphadenopathy [No CVA Tenderness] : no CVA  tenderness [No Spinal Tenderness] : no spinal tenderness [No Joint Swelling] : no joint swelling [Grossly Normal Strength/Tone] : grossly normal strength/tone [No Rash] : no rash [Coordination Grossly Intact] : coordination grossly intact [No Focal Deficits] : no focal deficits [Normal Gait] : normal gait [Deep Tendon Reflexes (DTR)] : deep tendon reflexes were 2+ and symmetric [Speech Grossly Normal] : speech grossly normal [Memory Grossly Normal] : memory grossly normal [Normal Affect] : the affect was normal [Alert and Oriented x3] : oriented to person, place, and time [Normal Mood] : the mood was normal [Normal Insight/Judgement] : insight and judgment were intact

## 2019-07-16 ENCOUNTER — FORM ENCOUNTER (OUTPATIENT)
Age: 69
End: 2019-07-16

## 2019-07-17 ENCOUNTER — APPOINTMENT (OUTPATIENT)
Dept: MRI IMAGING | Facility: CLINIC | Age: 69
End: 2019-07-17

## 2019-07-17 ENCOUNTER — OUTPATIENT (OUTPATIENT)
Dept: OUTPATIENT SERVICES | Facility: HOSPITAL | Age: 69
LOS: 1 days | End: 2019-07-17
Payer: MEDICARE

## 2019-07-17 DIAGNOSIS — M54.9 DORSALGIA, UNSPECIFIED: ICD-10-CM

## 2019-07-17 DIAGNOSIS — Z96.643 PRESENCE OF ARTIFICIAL HIP JOINT, BILATERAL: Chronic | ICD-10-CM

## 2019-07-17 DIAGNOSIS — Z00.8 ENCOUNTER FOR OTHER GENERAL EXAMINATION: ICD-10-CM

## 2019-07-17 PROCEDURE — 72148 MRI LUMBAR SPINE W/O DYE: CPT | Mod: 26

## 2019-07-17 PROCEDURE — 72148 MRI LUMBAR SPINE W/O DYE: CPT

## 2019-07-29 ENCOUNTER — APPOINTMENT (OUTPATIENT)
Dept: INTERNAL MEDICINE | Facility: CLINIC | Age: 69
End: 2019-07-29
Payer: MEDICARE

## 2019-07-29 VITALS — SYSTOLIC BLOOD PRESSURE: 128 MMHG | DIASTOLIC BLOOD PRESSURE: 70 MMHG

## 2019-07-29 VITALS
TEMPERATURE: 98.4 F | HEART RATE: 78 BPM | DIASTOLIC BLOOD PRESSURE: 80 MMHG | HEIGHT: 65 IN | BODY MASS INDEX: 18.49 KG/M2 | SYSTOLIC BLOOD PRESSURE: 140 MMHG | RESPIRATION RATE: 14 BRPM | WEIGHT: 111 LBS | OXYGEN SATURATION: 99 %

## 2019-07-29 PROCEDURE — 99214 OFFICE O/P EST MOD 30 MIN: CPT

## 2019-07-29 NOTE — PHYSICAL EXAM
[No Acute Distress] : no acute distress [Well-Appearing] : well-appearing [Well Developed] : well developed [Well Nourished] : well nourished [Normal Sclera/Conjunctiva] : normal sclera/conjunctiva [PERRL] : pupils equal round and reactive to light [EOMI] : extraocular movements intact [Normal Oropharynx] : the oropharynx was normal [Normal Outer Ear/Nose] : the outer ears and nose were normal in appearance [Supple] : supple [No Lymphadenopathy] : no lymphadenopathy [No JVD] : no jugular venous distention [Thyroid Normal, No Nodules] : the thyroid was normal and there were no nodules present [No Accessory Muscle Use] : no accessory muscle use [No Respiratory Distress] : no respiratory distress  [Clear to Auscultation] : lungs were clear to auscultation bilaterally [Regular Rhythm] : with a regular rhythm [Normal Rate] : normal rate  [Normal S1, S2] : normal S1 and S2 [No Murmur] : no murmur heard [No Abdominal Bruit] : a ~M bruit was not heard ~T in the abdomen [No Varicosities] : no varicosities [No Carotid Bruits] : no carotid bruits [No Palpable Aorta] : no palpable aorta [No Edema] : there was no peripheral edema [Pedal Pulses Present] : the pedal pulses are present [Non Tender] : non-tender [Soft] : abdomen soft [No Extremity Clubbing/Cyanosis] : no extremity clubbing/cyanosis [No HSM] : no HSM [No Masses] : no abdominal mass palpated [Non-distended] : non-distended [Normal Anterior Cervical Nodes] : no anterior cervical lymphadenopathy [Normal Bowel Sounds] : normal bowel sounds [Normal Posterior Cervical Nodes] : no posterior cervical lymphadenopathy [No Spinal Tenderness] : no spinal tenderness [No CVA Tenderness] : no CVA  tenderness [No Joint Swelling] : no joint swelling [Grossly Normal Strength/Tone] : grossly normal strength/tone [No Rash] : no rash [No Focal Deficits] : no focal deficits [Coordination Grossly Intact] : coordination grossly intact [Normal Gait] : normal gait [Normal Insight/Judgement] : insight and judgment were intact [Normal Affect] : the affect was normal [Deep Tendon Reflexes (DTR)] : deep tendon reflexes were 2+ and symmetric

## 2019-07-31 ENCOUNTER — RX RENEWAL (OUTPATIENT)
Age: 69
End: 2019-07-31

## 2019-08-23 ENCOUNTER — APPOINTMENT (OUTPATIENT)
Dept: INTERNAL MEDICINE | Facility: CLINIC | Age: 69
End: 2019-08-23
Payer: MEDICARE

## 2019-08-23 VITALS
SYSTOLIC BLOOD PRESSURE: 130 MMHG | DIASTOLIC BLOOD PRESSURE: 72 MMHG | TEMPERATURE: 98.8 F | OXYGEN SATURATION: 98 % | RESPIRATION RATE: 14 BRPM | HEART RATE: 86 BPM | WEIGHT: 111 LBS | HEIGHT: 65 IN | BODY MASS INDEX: 18.49 KG/M2

## 2019-08-23 DIAGNOSIS — J20.8 ACUTE BRONCHITIS DUE TO OTHER SPECIFIED ORGANISMS: ICD-10-CM

## 2019-08-23 PROCEDURE — 99214 OFFICE O/P EST MOD 30 MIN: CPT

## 2019-08-23 NOTE — HISTORY OF PRESENT ILLNESS
[Cold Symptoms] : cold symptoms [Sore Throat] : sore throat [Moderate] : moderate [___ Days ago] : [unfilled] days ago [Episodic] : episodic  [Congestion] : congestion [Cough] : cough [Fever] : fever [Worsening] : worsening [Chills] : no chills [Shortness Of Breath] : no shortness of breath

## 2019-08-23 NOTE — PHYSICAL EXAM
[Well Nourished] : well nourished [Well Developed] : well developed [No Acute Distress] : no acute distress [Normal Voice/Communication] : normal voice/communication [Well-Appearing] : well-appearing [EOMI] : extraocular movements intact [PERRL] : pupils equal round and reactive to light [Normal Sclera/Conjunctiva] : normal sclera/conjunctiva [Normal Outer Ear/Nose] : the outer ears and nose were normal in appearance [Normal Oropharynx] : the oropharynx was normal [No JVD] : no jugular venous distention [Normal TMs] : both tympanic membranes were normal [Supple] : supple [No Lymphadenopathy] : no lymphadenopathy [Thyroid Normal, No Nodules] : the thyroid was normal and there were no nodules present [No Respiratory Distress] : no respiratory distress  [No Accessory Muscle Use] : no accessory muscle use [Normal Rate] : normal rate  [Normal S1, S2] : normal S1 and S2 [No Murmur] : no murmur heard [Regular Rhythm] : with a regular rhythm [No Abdominal Bruit] : a ~M bruit was not heard ~T in the abdomen [No Carotid Bruits] : no carotid bruits [No Varicosities] : no varicosities [No Edema] : there was no peripheral edema [Pedal Pulses Present] : the pedal pulses are present [No Extremity Clubbing/Cyanosis] : no extremity clubbing/cyanosis [No Palpable Aorta] : no palpable aorta [Non Tender] : non-tender [Soft] : abdomen soft [Non-distended] : non-distended [No Masses] : no abdominal mass palpated [Normal Bowel Sounds] : normal bowel sounds [No HSM] : no HSM [Normal Supraclavicular Nodes] : no supraclavicular lymphadenopathy [Normal Posterior Cervical Nodes] : no posterior cervical lymphadenopathy [Normal Anterior Cervical Nodes] : no anterior cervical lymphadenopathy [No Spinal Tenderness] : no spinal tenderness [No CVA Tenderness] : no CVA  tenderness [No Joint Swelling] : no joint swelling [Grossly Normal Strength/Tone] : grossly normal strength/tone [No Rash] : no rash [Coordination Grossly Intact] : coordination grossly intact [No Focal Deficits] : no focal deficits [Deep Tendon Reflexes (DTR)] : deep tendon reflexes were 2+ and symmetric [Normal Gait] : normal gait [Speech Grossly Normal] : speech grossly normal [Memory Grossly Normal] : memory grossly normal [Normal Affect] : the affect was normal [Alert and Oriented x3] : oriented to person, place, and time [Normal Mood] : the mood was normal [Normal Insight/Judgement] : insight and judgment were intact [de-identified] : scattered rhonchi

## 2019-08-27 ENCOUNTER — FORM ENCOUNTER (OUTPATIENT)
Age: 69
End: 2019-08-27

## 2019-08-28 ENCOUNTER — OUTPATIENT (OUTPATIENT)
Dept: OUTPATIENT SERVICES | Facility: HOSPITAL | Age: 69
LOS: 1 days | End: 2019-08-28
Payer: MEDICARE

## 2019-08-28 ENCOUNTER — APPOINTMENT (OUTPATIENT)
Dept: RADIOLOGY | Facility: CLINIC | Age: 69
End: 2019-08-28
Payer: MEDICARE

## 2019-08-28 ENCOUNTER — APPOINTMENT (OUTPATIENT)
Dept: INTERNAL MEDICINE | Facility: CLINIC | Age: 69
End: 2019-08-28
Payer: MEDICARE

## 2019-08-28 ENCOUNTER — FORM ENCOUNTER (OUTPATIENT)
Age: 69
End: 2019-08-28

## 2019-08-28 VITALS
HEIGHT: 65 IN | RESPIRATION RATE: 14 BRPM | BODY MASS INDEX: 18.49 KG/M2 | OXYGEN SATURATION: 98 % | SYSTOLIC BLOOD PRESSURE: 144 MMHG | WEIGHT: 111 LBS | DIASTOLIC BLOOD PRESSURE: 82 MMHG | TEMPERATURE: 98.1 F | HEART RATE: 76 BPM

## 2019-08-28 VITALS — SYSTOLIC BLOOD PRESSURE: 130 MMHG | DIASTOLIC BLOOD PRESSURE: 70 MMHG

## 2019-08-28 DIAGNOSIS — J45.909 UNSPECIFIED ASTHMA, UNCOMPLICATED: ICD-10-CM

## 2019-08-28 DIAGNOSIS — Z96.643 PRESENCE OF ARTIFICIAL HIP JOINT, BILATERAL: Chronic | ICD-10-CM

## 2019-08-28 DIAGNOSIS — R91.1 SOLITARY PULMONARY NODULE: ICD-10-CM

## 2019-08-28 PROCEDURE — 71046 X-RAY EXAM CHEST 2 VIEWS: CPT

## 2019-08-28 PROCEDURE — 71046 X-RAY EXAM CHEST 2 VIEWS: CPT | Mod: 26

## 2019-08-28 PROCEDURE — 99214 OFFICE O/P EST MOD 30 MIN: CPT

## 2019-08-28 NOTE — PHYSICAL EXAM
[No Acute Distress] : no acute distress [Well Nourished] : well nourished [Well Developed] : well developed [Well-Appearing] : well-appearing [Normal Voice/Communication] : normal voice/communication [Normal Sclera/Conjunctiva] : normal sclera/conjunctiva [PERRL] : pupils equal round and reactive to light [EOMI] : extraocular movements intact [Normal Outer Ear/Nose] : the outer ears and nose were normal in appearance [Normal Oropharynx] : the oropharynx was normal [No JVD] : no jugular venous distention [No Lymphadenopathy] : no lymphadenopathy [Supple] : supple [Thyroid Normal, No Nodules] : the thyroid was normal and there were no nodules present [No Accessory Muscle Use] : no accessory muscle use [No Respiratory Distress] : no respiratory distress  [Decreased Breath Sounds] : breath sounds were decreased diffusely [Normal Rate] : normal rate  [Regular Rhythm] : with a regular rhythm [Normal S1, S2] : normal S1 and S2 [No Murmur] : no murmur heard [No Carotid Bruits] : no carotid bruits [No Abdominal Bruit] : a ~M bruit was not heard ~T in the abdomen [No Varicosities] : no varicosities [Pedal Pulses Present] : the pedal pulses are present [No Edema] : there was no peripheral edema [No Extremity Clubbing/Cyanosis] : no extremity clubbing/cyanosis [No Palpable Aorta] : no palpable aorta [Soft] : abdomen soft [Non Tender] : non-tender [Non-distended] : non-distended [No Masses] : no abdominal mass palpated [Normal Bowel Sounds] : normal bowel sounds [No HSM] : no HSM [Normal Posterior Cervical Nodes] : no posterior cervical lymphadenopathy [Normal Anterior Cervical Nodes] : no anterior cervical lymphadenopathy [No CVA Tenderness] : no CVA  tenderness [No Spinal Tenderness] : no spinal tenderness [Grossly Normal Strength/Tone] : grossly normal strength/tone [No Joint Swelling] : no joint swelling [No Rash] : no rash [Coordination Grossly Intact] : coordination grossly intact [No Focal Deficits] : no focal deficits [Normal Gait] : normal gait [Deep Tendon Reflexes (DTR)] : deep tendon reflexes were 2+ and symmetric [Speech Grossly Normal] : speech grossly normal [Memory Grossly Normal] : memory grossly normal [Normal Affect] : the affect was normal [Alert and Oriented x3] : oriented to person, place, and time [Normal Mood] : the mood was normal [Normal Insight/Judgement] : insight and judgment were intact

## 2019-08-29 ENCOUNTER — APPOINTMENT (OUTPATIENT)
Dept: CT IMAGING | Facility: CLINIC | Age: 69
End: 2019-08-29

## 2019-08-29 ENCOUNTER — OUTPATIENT (OUTPATIENT)
Dept: OUTPATIENT SERVICES | Facility: HOSPITAL | Age: 69
LOS: 1 days | End: 2019-08-29
Payer: MEDICARE

## 2019-08-29 DIAGNOSIS — Z00.8 ENCOUNTER FOR OTHER GENERAL EXAMINATION: ICD-10-CM

## 2019-08-29 DIAGNOSIS — Z96.643 PRESENCE OF ARTIFICIAL HIP JOINT, BILATERAL: Chronic | ICD-10-CM

## 2019-08-29 PROCEDURE — 71250 CT THORAX DX C-: CPT | Mod: 26

## 2019-08-29 PROCEDURE — 71250 CT THORAX DX C-: CPT

## 2019-10-14 ENCOUNTER — RX RENEWAL (OUTPATIENT)
Age: 69
End: 2019-10-14

## 2019-10-29 ENCOUNTER — APPOINTMENT (OUTPATIENT)
Dept: INTERNAL MEDICINE | Facility: CLINIC | Age: 69
End: 2019-10-29
Payer: MEDICARE

## 2019-10-29 VITALS
DIASTOLIC BLOOD PRESSURE: 74 MMHG | HEIGHT: 65 IN | OXYGEN SATURATION: 97 % | BODY MASS INDEX: 17.83 KG/M2 | WEIGHT: 107 LBS | HEART RATE: 78 BPM | RESPIRATION RATE: 14 BRPM | SYSTOLIC BLOOD PRESSURE: 136 MMHG | TEMPERATURE: 97.9 F

## 2019-10-29 DIAGNOSIS — Z00.00 ENCOUNTER FOR GENERAL ADULT MEDICAL EXAMINATION W/OUT ABNORMAL FINDINGS: ICD-10-CM

## 2019-10-29 PROCEDURE — 90732 PPSV23 VACC 2 YRS+ SUBQ/IM: CPT

## 2019-10-29 PROCEDURE — G0009: CPT

## 2019-10-29 PROCEDURE — 90662 IIV NO PRSV INCREASED AG IM: CPT

## 2019-10-29 PROCEDURE — 36415 COLL VENOUS BLD VENIPUNCTURE: CPT

## 2019-10-29 PROCEDURE — G0439: CPT

## 2019-10-29 PROCEDURE — G0008: CPT

## 2019-10-29 NOTE — HEALTH RISK ASSESSMENT
[Good] : ~his/her~  mood as  good [No] : In the past 12 months have you used drugs other than those required for medical reasons? No [No falls in past year] : Patient reported no falls in the past year [0] : 2) Feeling down, depressed, or hopeless: Not at all (0) [None] : None [With Family] : lives with family [] :  [Fully functional (bathing, dressing, toileting, transferring, walking, feeding)] : Fully functional (bathing, dressing, toileting, transferring, walking, feeding) [Fully functional (using the telephone, shopping, preparing meals, housekeeping, doing laundry, using] : Fully functional and needs no help or supervision to perform IADLs (using the telephone, shopping, preparing meals, housekeeping, doing laundry, using transportation, managing medications and managing finances) [Smoke Detector] : smoke detector [Carbon Monoxide Detector] : carbon monoxide detector [Seat Belt] :  uses seat belt [] : No [Reports changes in hearing] : Reports no changes in hearing [Reports changes in vision] : Reports no changes in vision [Guns at Home] : no guns at home [Travel to Developing Areas] : does not  travel to developing areas [TB Exposure] : is not being exposed to tuberculosis

## 2019-10-30 LAB
25(OH)D3 SERPL-MCNC: 43.2 NG/ML
ALBUMIN SERPL ELPH-MCNC: 4.8 G/DL
ALP BLD-CCNC: 74 U/L
ALT SERPL-CCNC: 18 U/L
ANION GAP SERPL CALC-SCNC: 13 MMOL/L
APPEARANCE: CLEAR
AST SERPL-CCNC: 23 U/L
BASOPHILS # BLD AUTO: 0.08 K/UL
BASOPHILS NFR BLD AUTO: 1.2 %
BILIRUB SERPL-MCNC: 0.4 MG/DL
BILIRUBIN URINE: NEGATIVE
BLOOD URINE: NEGATIVE
BUN SERPL-MCNC: 28 MG/DL
CALCIUM SERPL-MCNC: 10 MG/DL
CHLORIDE SERPL-SCNC: 97 MMOL/L
CHOLEST SERPL-MCNC: 215 MG/DL
CHOLEST/HDLC SERPL: 2.7 RATIO
CK SERPL-CCNC: 163 U/L
CO2 SERPL-SCNC: 23 MMOL/L
COLOR: NORMAL
CREAT SERPL-MCNC: 1.31 MG/DL
EOSINOPHIL # BLD AUTO: 0.18 K/UL
EOSINOPHIL NFR BLD AUTO: 2.8 %
ESTIMATED AVERAGE GLUCOSE: 120 MG/DL
GLUCOSE QUALITATIVE U: NEGATIVE
GLUCOSE SERPL-MCNC: 97 MG/DL
HBA1C MFR BLD HPLC: 5.8 %
HCT VFR BLD CALC: 38.1 %
HDLC SERPL-MCNC: 80 MG/DL
HGB BLD-MCNC: 12.5 G/DL
IMM GRANULOCYTES NFR BLD AUTO: 0.2 %
KETONES URINE: NEGATIVE
LDLC SERPL CALC-MCNC: 120 MG/DL
LEUKOCYTE ESTERASE URINE: NEGATIVE
LYMPHOCYTES # BLD AUTO: 1.84 K/UL
LYMPHOCYTES NFR BLD AUTO: 28.1 %
MAN DIFF?: NORMAL
MCHC RBC-ENTMCNC: 31.2 PG
MCHC RBC-ENTMCNC: 32.8 GM/DL
MCV RBC AUTO: 95 FL
MONOCYTES # BLD AUTO: 0.62 K/UL
MONOCYTES NFR BLD AUTO: 9.5 %
NEUTROPHILS # BLD AUTO: 3.81 K/UL
NEUTROPHILS NFR BLD AUTO: 58.2 %
NITRITE URINE: NEGATIVE
PH URINE: 5.5
PLATELET # BLD AUTO: 198 K/UL
POTASSIUM SERPL-SCNC: 5.2 MMOL/L
PROT SERPL-MCNC: 7.5 G/DL
PROTEIN URINE: NEGATIVE
RBC # BLD: 4.01 M/UL
RBC # FLD: 11.9 %
SODIUM SERPL-SCNC: 133 MMOL/L
SPECIFIC GRAVITY URINE: 1.01
TRIGL SERPL-MCNC: 77 MG/DL
TSH SERPL-ACNC: 1.47 UIU/ML
UROBILINOGEN URINE: NORMAL
WBC # FLD AUTO: 6.54 K/UL

## 2019-11-25 NOTE — ED ADULT NURSE NOTE - TEMPLATE LIST FOR HEAD TO TOE ASSESSMENT
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  Please see admission H+P for additional details.    Hospital Course   Clem Bishop was admitted on 11/23/2019.  He is a 77 year old male with h/o neurosarcoidosis and resultant paraplegia in LEs, h/o DVTs on warfarin, who presents with slurred speech and facial numbness concerning for CVA.  CT/CTA of the head was negative for any acute pathology.  Patient does have chronic right-sided weakness as well as lower extremity paraplegia due to his neurosarcoidosis.  Neurochecks have been stable.  LDL was checked and found to be 80, he will A1c is 6.4.  Patient has been intermittently hypertensive above 200 systolic, requiring PRN hydralazine and labetalol at times.  Hypertension was permitted in the setting of acute CVA, hypertensive medications were slowly added back with atenolol first and then losartan on day of discharge.  We will also start him on 5 mg of amlodipine.  MRI of the brain obtained on day of discharge showed new left thalamic CVA, which is consistent with the symptoms.  Neurology recommended baby aspirin in addition to his warfarin, we will start him on this at 81 mg daily.  He will also be started on low-dose Zocor for an LDL of 80.  His symptoms of facial numbness have completely resolved, his right-sided weakness has almost resolved completely.  Currently, physical and occupational as well as speech therapy did not appear to be indicated.  We  will have him follow-up with his primary care physician Dr. Bowling in approximately 5 to 7 days to assess for resolution and to recheck his blood pressure and titrate medications as indicated.    Sara Maya MD      Significant Results and Procedures   See below.    Pending Results   These results will be followed up by Anoop Bowling    Unresulted Labs Ordered in the Past 30 Days of this Admission     Date and Time Order Name Status Description    11/23/2019 1032 Estimated Average Glucose In process           Code Status   Full Code       Primary Care Physician   Anoop Bowling    Physical Exam   Temp: 98.1  F (36.7  C) Temp src: Tympanic BP: (!) 197/94 Pulse: 89 Heart Rate: 89 Resp: 16 SpO2: 93 % O2 Device: None (Room air)    Vitals:    11/23/19 0804 11/23/19 1020 11/24/19 0509   Weight: 82.6 kg (182 lb) 88.2 kg (194 lb 7.1 oz) 88.2 kg (194 lb 7.1 oz)     Vital Signs with Ranges  Temp:  [97.6  F (36.4  C)-98.6  F (37  C)] 98.1  F (36.7  C)  Pulse:  [64-90] 89  Heart Rate:  [60-89] 89  Resp:  [14-16] 16  BP: (119-251)/() 197/94  SpO2:  [89 %-97 %] 93 %  I/O last 3 completed shifts:  In: 240 [P.O.:240]  Out: 1350 [Urine:1350]    Constitutional: AA, NAD  Eyes: PERRLA, no injection, no icterus  HEENT: atraumatic, normocephalic, no facial asymmetry  Respiratory: CTA b/l  Cardiovascular: S1 S2 RRR  GI: soft, NT, ND, + bowel sounds  Lymph/Hematologic: no palpable lymphadenopathy  Skin: no rashes, no lesions  Musculoskeletal: No edema, good tone, no deformities  Neurologic: oriented x 3, paraplegia LEs, strength in UEs intact b/l 4/5, right 3rd and 4th digit unable to extend completely  Psychiatric: appropriate affect      Discharge Disposition   Discharged to home  Condition at discharge: Stable    Consultations This Hospital Stay   PHARMACY TO DOSE WARFARIN  SPEECH LANGUAGE PATH ADULT IP CONSULT  PHYSICAL THERAPY ADULT IP CONSULT  OCCUPATIONAL THERAPY ADULT IP CONSULT    Time Spent on this Encounter   Sara MAZARIEGOS  Fina Maya MD, MD, personally saw the patient today and spent greater than 30 minutes discharging this patient.    Discharge Orders      Reason for your hospital stay    CVA     Follow-up and recommended labs and tests     Follow up with primary care provider, Anoop Bowling, within 7 days for hospital follow- up.  No follow up labs or test are needed.     Activity    Your activity upon discharge: activity as tolerated     Monitor and record    blood pressure daily, call MD if sbp > 200     Full Code     Diet    Follow this diet upon discharge: Orders Placed This Encounter      Moderate Consistent CHO Diet     Discharge Medications   Current Discharge Medication List      START taking these medications    Details   amLODIPine (NORVASC) 5 MG tablet Take 1 tablet (5 mg) by mouth daily  Qty: 30 tablet, Refills: 0    Associated Diagnoses: Cerebrovascular accident (CVA), unspecified mechanism (H)      aspirin (ASA) 81 MG EC tablet Take 1 tablet (81 mg) by mouth daily  Qty: 30 tablet, Refills: 0    Associated Diagnoses: Cerebrovascular accident (CVA), unspecified mechanism (H)      simvastatin (ZOCOR) 10 MG tablet Take 1 tablet (10 mg) by mouth At Bedtime  Qty: 30 tablet, Refills: 0    Associated Diagnoses: Cerebrovascular accident (CVA), unspecified mechanism (H)         CONTINUE these medications which have NOT CHANGED    Details   atenolol (TENORMIN) 50 MG tablet Take 50 mg by mouth daily      Calcium Carbonate (CALCIUM 600 PO) Take 1 tablet by mouth daily       glyBURIDE (DIABETA /MICRONASE) 5 MG tablet Take 2.5 mg by mouth 2 times daily (with meals)      losartan (COZAAR) 50 MG tablet Take 50 mg by mouth daily      METFORMIN HCL PO Take 750 mg by mouth 2 times daily (with meals)       OMEPRAZOLE PO Take 40 mg by mouth every morning       oxybutynin (DITROPAN-XL) 5 MG 24 hr tablet Take 5 mg by mouth daily       WARFARIN SODIUM PO Takes 1 mg on Monday. Takes 1.5 mg on Sunday, Tuesday, Wednesday, Thursday, Friday, and  Saturday.      ibuprofen (ADVIL,MOTRIN) 600 MG tablet Take 1 tablet (600 mg) by mouth every 6 hours as needed for pain  Qty: 30 tablet, Refills: 0    Associated Diagnoses: H/O local excision of skin lesion           Allergies   No Known Allergies  Data   Most Recent 3 CBC's:  Recent Labs   Lab Test 11/23/19  0813   WBC 7.2   HGB 14.9   MCV 80         Most Recent 3 BMP's:  Recent Labs   Lab Test 11/23/19  0813      POTASSIUM 3.6   CHLORIDE 104   CO2 29   BUN 12   CR 0.61*   ANIONGAP 5   TREVER 8.8   *     Most Recent 2 LFT's:  Recent Labs   Lab Test 11/23/19  0813   AST 22   ALT 32   ALKPHOS 80   BILITOTAL 0.6     Most Recent INR's and Anticoagulation Dosing History:  Anticoagulation Dose History     Recent Dosing and Labs Latest Ref Rng & Units 6/23/2016 11/23/2019 11/24/2019 11/25/2019    Warfarin 1.5 mg - - 1.5 mg 1.5 mg -    INR 0.86 - 1.14 1.35(H) 2.40(H) 2.65(H) 2.69(H)        Most Recent 3 Troponin's:  Recent Labs   Lab Test 11/23/19  0813   TROPI <0.015     Most Recent Cholesterol Panel:  Recent Labs   Lab Test 11/24/19  0443   CHOL 138   LDL 80   HDL 38*   TRIG 100     Most Recent 6 Bacteria Isolates From Any Culture (See EPIC Reports for Culture Details):No lab results found.  Most Recent TSH, T4 and A1c Labs:  Recent Labs   Lab Test 11/23/19  1032   A1C 6.4*     Results for orders placed or performed during the hospital encounter of 11/23/19   CT Head w/o Contrast    Narrative    Exam: CT HEAD W/O CONTRAST    Clinical history:77 years Male Neuro deficit(s), subacute    Comparisons:None    Technique: Axial CT imaging of the head was performed Without  intervenous contrast.    FINDINGS:   Ventricles and sulci are symmetric. The gray-white matter  differentiation throughout the brain is well maintained. There is  moderate periventricular white matter change of chronic small vessel  ischemic disease. There is no evidence of intracranial mass or  hemorrhage. Visualized portions of the paranasal  sinuses and mastoid  air cells are well aerated. There is no evidence of skull fracture.      Impression    IMPRESSION: No acute changes.    REJI LYNN MD   CTA Head Neck with Contrast    Narrative    CTA  HEAD NECK WITH CONTRAST    HISTORY: 77 years Male Neuro deficit(s), subacute; Evaluate for  dissection/thromboembolism    COMPARISON: None    TECHNIQUE: Contrast-enhanced CT angiography of the neck and head was  performed with intervenous contrast. Multiplanar reconstructions and  MIP, volume rendered and 3-D MIP reconstructions were obtained on a  3-D workstation..    FINDINGS:    Right neck:            Brachiocephalic artery: patent            Common carotid artery:  patent            Internal carotid artery:Widely patent there is mild  calcified atherosclerotic disease of the carotid bulb.            Vertebral artery:Widely patent                Left neck:            Common carotid artery: Widely patent            Internal carotid artery:Widely patent there is calcified  atherosclerotic plaque at the carotid bulb without significant  stenosis.            Vertebral artery:Widely patent    Head:           Anatomy: There is fetal origin of the left posterior cerebral  artery, this is a normal anatomic variant.           Anterior circulation:The vessels are patent without evidence  of abrupt cut off. There is no evidence of aneurysm.           Posterior circulation:The vessels are patent. There is no  abrupt cut off. There is no evidence of aneurysm      Impression    IMPRESSION:No acute findings. There is no evidence of significant  carotid artery stenosis. There is no evidence of abrupt vessel cut off  or intracranial aneurysm.    REJI LYNN MD   XR Chest Port 1 View    Narrative    XR CHEST PORT 1 VW    HISTORY: 77 yearsMale stroke    TECHNIQUE: A single view of the chest was performed    COMPARISON: 3/7/2008    FINDINGS: There is elevation of the left hemidiaphragm and left  basilar atelectasis.  The upper left lung and right lung are clear.        Impression    IMPRESSION: Elevation left hemidiaphragm suggesting left diaphragmatic  paralysis, this was present on the prior study. There is left basilar  atelectasis. Lungs are otherwise clear.    REJI LYNN MD   MR Brain w/o & w Contrast    Narrative    PROCEDURE: MR BRAIN W/O & W CONTRAST 11/25/2019 10:25 AM    HISTORY: Focal neuro deficit, > 6 hrs, stroke suspected    COMPARISONS: None.    Meds/Dose Given:    TECHNIQUE: Images were obtained sagittally T1 weighted. Images were  obtained axially diffusion FLAIR gradient echo T1 and T2-weighted.  Images were obtained axially and coronally T1 weighted following  gadolinium administration.    FINDINGS: There is an acute lacunar infarct seen in the left thalmus.  There are foci of bright signal in the white matter of both cerebral  hemispheres as well as within the brainstem consistent with small  vessel disease. There are no enhancing masses ventricular shifts or  extracerebral collections. The pituitary and optic chiasm appear  normal. The basal cisterns and internal auditory canals are normal.  Cranial vault is intact. The paranasal sinuses are clear.         Impression    IMPRESSION: Acute lacunar infarct in the left thalmus    MELITON REDDING MD        Neuro

## 2019-12-11 ENCOUNTER — APPOINTMENT (OUTPATIENT)
Dept: INTERNAL MEDICINE | Facility: CLINIC | Age: 69
End: 2019-12-11
Payer: MEDICARE

## 2019-12-11 VITALS
SYSTOLIC BLOOD PRESSURE: 150 MMHG | RESPIRATION RATE: 14 BRPM | TEMPERATURE: 97.3 F | OXYGEN SATURATION: 99 % | DIASTOLIC BLOOD PRESSURE: 80 MMHG | HEIGHT: 65 IN | HEART RATE: 82 BPM

## 2019-12-11 VITALS — SYSTOLIC BLOOD PRESSURE: 140 MMHG | DIASTOLIC BLOOD PRESSURE: 80 MMHG

## 2019-12-11 DIAGNOSIS — M79.672 PAIN IN LEFT FOOT: ICD-10-CM

## 2019-12-11 PROCEDURE — 99214 OFFICE O/P EST MOD 30 MIN: CPT

## 2019-12-11 NOTE — PHYSICAL EXAM
[No Acute Distress] : no acute distress [Well Nourished] : well nourished [Normal Voice/Communication] : normal voice/communication [Well-Appearing] : well-appearing [Well Developed] : well developed [PERRL] : pupils equal round and reactive to light [Normal Sclera/Conjunctiva] : normal sclera/conjunctiva [EOMI] : extraocular movements intact [Normal Oropharynx] : the oropharynx was normal [Normal Outer Ear/Nose] : the outer ears and nose were normal in appearance [No JVD] : no jugular venous distention [Supple] : supple [No Lymphadenopathy] : no lymphadenopathy [No Respiratory Distress] : no respiratory distress  [Thyroid Normal, No Nodules] : the thyroid was normal and there were no nodules present [No Accessory Muscle Use] : no accessory muscle use [Clear to Auscultation] : lungs were clear to auscultation bilaterally [Normal Rate] : normal rate  [Regular Rhythm] : with a regular rhythm [Normal S1, S2] : normal S1 and S2 [No Murmur] : no murmur heard [No Carotid Bruits] : no carotid bruits [No Abdominal Bruit] : a ~M bruit was not heard ~T in the abdomen [No Varicosities] : no varicosities [Pedal Pulses Present] : the pedal pulses are present [No Edema] : there was no peripheral edema [No Palpable Aorta] : no palpable aorta [Soft] : abdomen soft [No Extremity Clubbing/Cyanosis] : no extremity clubbing/cyanosis [No Masses] : no abdominal mass palpated [Non-distended] : non-distended [Non Tender] : non-tender [Normal Posterior Cervical Nodes] : no posterior cervical lymphadenopathy [No HSM] : no HSM [Normal Bowel Sounds] : normal bowel sounds [No CVA Tenderness] : no CVA  tenderness [Normal Anterior Cervical Nodes] : no anterior cervical lymphadenopathy [Grossly Normal Strength/Tone] : grossly normal strength/tone [No Spinal Tenderness] : no spinal tenderness [No Joint Swelling] : no joint swelling [No Rash] : no rash [Coordination Grossly Intact] : coordination grossly intact [No Focal Deficits] : no focal deficits [Normal Gait] : normal gait [Deep Tendon Reflexes (DTR)] : deep tendon reflexes were 2+ and symmetric [Speech Grossly Normal] : speech grossly normal [Memory Grossly Normal] : memory grossly normal [Normal Affect] : the affect was normal [Alert and Oriented x3] : oriented to person, place, and time [Normal Insight/Judgement] : insight and judgment were intact [Normal Mood] : the mood was normal [de-identified] : tenderness left foot

## 2019-12-12 ENCOUNTER — FORM ENCOUNTER (OUTPATIENT)
Age: 69
End: 2019-12-12

## 2019-12-13 ENCOUNTER — OUTPATIENT (OUTPATIENT)
Dept: OUTPATIENT SERVICES | Facility: HOSPITAL | Age: 69
LOS: 1 days | End: 2019-12-13
Payer: MEDICARE

## 2019-12-13 ENCOUNTER — APPOINTMENT (OUTPATIENT)
Dept: RADIOLOGY | Facility: CLINIC | Age: 69
End: 2019-12-13
Payer: MEDICARE

## 2019-12-13 ENCOUNTER — CLINICAL ADVICE (OUTPATIENT)
Age: 69
End: 2019-12-13

## 2019-12-13 DIAGNOSIS — M79.672 PAIN IN LEFT FOOT: ICD-10-CM

## 2019-12-13 DIAGNOSIS — Z96.643 PRESENCE OF ARTIFICIAL HIP JOINT, BILATERAL: Chronic | ICD-10-CM

## 2019-12-13 PROCEDURE — 73630 X-RAY EXAM OF FOOT: CPT

## 2019-12-13 PROCEDURE — 73630 X-RAY EXAM OF FOOT: CPT | Mod: 26,LT

## 2020-03-06 ENCOUNTER — APPOINTMENT (OUTPATIENT)
Dept: INTERNAL MEDICINE | Facility: CLINIC | Age: 70
End: 2020-03-06
Payer: MEDICARE

## 2020-03-06 VITALS
HEART RATE: 80 BPM | OXYGEN SATURATION: 98 % | SYSTOLIC BLOOD PRESSURE: 150 MMHG | TEMPERATURE: 98.2 F | RESPIRATION RATE: 14 BRPM | HEIGHT: 65 IN | DIASTOLIC BLOOD PRESSURE: 80 MMHG

## 2020-03-06 VITALS — SYSTOLIC BLOOD PRESSURE: 142 MMHG | DIASTOLIC BLOOD PRESSURE: 74 MMHG

## 2020-03-06 DIAGNOSIS — M79.672 PAIN IN LEFT FOOT: ICD-10-CM

## 2020-03-06 PROCEDURE — 99214 OFFICE O/P EST MOD 30 MIN: CPT

## 2020-03-06 NOTE — PHYSICAL EXAM
[No Acute Distress] : no acute distress [Well Nourished] : well nourished [Well Developed] : well developed [Well-Appearing] : well-appearing [Normal Voice/Communication] : normal voice/communication [Normal Sclera/Conjunctiva] : normal sclera/conjunctiva [PERRL] : pupils equal round and reactive to light [EOMI] : extraocular movements intact [Normal Outer Ear/Nose] : the outer ears and nose were normal in appearance [Normal Oropharynx] : the oropharynx was normal [No JVD] : no jugular venous distention [No Lymphadenopathy] : no lymphadenopathy [Supple] : supple [Thyroid Normal, No Nodules] : the thyroid was normal and there were no nodules present [No Respiratory Distress] : no respiratory distress  [No Accessory Muscle Use] : no accessory muscle use [Clear to Auscultation] : lungs were clear to auscultation bilaterally [Normal Rate] : normal rate  [Regular Rhythm] : with a regular rhythm [Normal S1, S2] : normal S1 and S2 [No Murmur] : no murmur heard [No Carotid Bruits] : no carotid bruits [No Abdominal Bruit] : a ~M bruit was not heard ~T in the abdomen [No Varicosities] : no varicosities [Pedal Pulses Present] : the pedal pulses are present [No Edema] : there was no peripheral edema [No Palpable Aorta] : no palpable aorta [No Extremity Clubbing/Cyanosis] : no extremity clubbing/cyanosis [Soft] : abdomen soft [Non Tender] : non-tender [Non-distended] : non-distended [No Masses] : no abdominal mass palpated [No HSM] : no HSM [Normal Bowel Sounds] : normal bowel sounds [Normal Posterior Cervical Nodes] : no posterior cervical lymphadenopathy [Normal Anterior Cervical Nodes] : no anterior cervical lymphadenopathy [No CVA Tenderness] : no CVA  tenderness [No Spinal Tenderness] : no spinal tenderness [No Joint Swelling] : no joint swelling [Grossly Normal Strength/Tone] : grossly normal strength/tone [No Rash] : no rash [Coordination Grossly Intact] : coordination grossly intact [No Focal Deficits] : no focal deficits [Normal Gait] : normal gait [Deep Tendon Reflexes (DTR)] : deep tendon reflexes were 2+ and symmetric [Normal Affect] : the affect was normal [Normal Insight/Judgement] : insight and judgment were intact [de-identified] : boot left foot

## 2020-03-20 ENCOUNTER — APPOINTMENT (OUTPATIENT)
Dept: INTERNAL MEDICINE | Facility: CLINIC | Age: 70
End: 2020-03-20

## 2020-05-28 ENCOUNTER — RX RENEWAL (OUTPATIENT)
Age: 70
End: 2020-05-28

## 2020-06-08 ENCOUNTER — APPOINTMENT (OUTPATIENT)
Dept: CARDIOLOGY | Facility: CLINIC | Age: 70
End: 2020-06-08
Payer: MEDICARE

## 2020-06-08 ENCOUNTER — NON-APPOINTMENT (OUTPATIENT)
Age: 70
End: 2020-06-08

## 2020-06-08 VITALS
HEIGHT: 65 IN | BODY MASS INDEX: 19.16 KG/M2 | DIASTOLIC BLOOD PRESSURE: 79 MMHG | OXYGEN SATURATION: 98 % | WEIGHT: 115 LBS | HEART RATE: 72 BPM | SYSTOLIC BLOOD PRESSURE: 144 MMHG

## 2020-06-08 DIAGNOSIS — R09.89 OTHER SPECIFIED SYMPTOMS AND SIGNS INVOLVING THE CIRCULATORY AND RESPIRATORY SYSTEMS: ICD-10-CM

## 2020-06-08 PROCEDURE — 93000 ELECTROCARDIOGRAM COMPLETE: CPT

## 2020-06-08 PROCEDURE — 99214 OFFICE O/P EST MOD 30 MIN: CPT

## 2020-06-08 NOTE — DISCUSSION/SUMMARY
[FreeTextEntry1] : The patient is doing well without any signs or symptoms of active heart disease. The patient is walking 3 miles a day without any chest pain or shortness of breath. On her medication blood pressure is okay.\par \par She does have  mild hyperlipidemia and elevated A1c. She'll go for regular stress test and a carotid Doppler is a screening test for vascular disease. When I get the results of the study we may adjust her medication.\par \par If all is well I will see her in one year.

## 2020-06-08 NOTE — HISTORY OF PRESENT ILLNESS
[FreeTextEntry1] : I saw Vikki Tellez in the office today for a cardiac follow up. 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 She is status post hip replacement surgery and doing well.\par \par She has a history of hypertension on losartan.  She is limited in exercise because of back and hip problems. She walks regularly and denies any exertional symptoms. She's been under a lot of stress recently. She is taking care of her ex- who is on hospice. She notes symptoms of shortness of breath and palpitation. These symptoms come and go randomly. Palpitations feel like a rapid heartbeat. She has no chest discomfort.\par \par She had a stress test 10/15 that showed no ischemia to a workload of 10 Mets. Most recent blood work performed 10/19 demonstrated a Cholesterol 2:15, triglycerides 77,HDL 80, and . A1c was 5.8. Echocardiogram 4/19 demonstrated an ejection fraction of 70%. There was mild MR and TR, with a PA pressure of 37. CT scan of the chest 9/19 was normal.\par \par A resting 12-lead electrocardiogram demonstrates sinus rhythm and is unchanged.\par \par  She has no family history of heart disease and does not smoke.\par

## 2020-06-08 NOTE — REVIEW OF SYSTEMS
[Feeling Fatigued] : feeling fatigued [Chest Pain] : chest pain [Joint Pain] : joint pain [Hand Pain] : hand pain [Foot Pain] : foot pain [Dizziness] : dizziness [Negative] : Genitourinary [Headache] : no headache [Blurry Vision] : no blurred vision [Seeing Double (Diplopia)] : no diplopia [Earache] : no earache [Sore Throat] : no sore throat [Sinus Pressure] : no sinus pressure [Shortness Of Breath] : no shortness of breath [Palpitations] : no palpitations [Skin: A Rash] : no rash: [Depression] : no depression [Anxiety] : no anxiety [Excessive Thirst] : no polydipsia [Easy Bleeding] : no tendency for easy bleeding [Easy Bruising] : no tendency for easy bruising

## 2020-06-08 NOTE — PHYSICAL EXAM
[General Appearance - Well Developed] : well developed [Normal Appearance] : normal appearance [Well Groomed] : well groomed [General Appearance - Well Nourished] : well nourished [No Deformities] : no deformities [General Appearance - In No Acute Distress] : no acute distress [Normal Conjunctiva] : the conjunctiva exhibited no abnormalities [Normal Oral Mucosa] : normal oral mucosa [Normal Jugular Venous A Waves Present] : normal jugular venous A waves present [Normal Jugular Venous V Waves Present] : normal jugular venous V waves present [No Jugular Venous Mccollum A Waves] : no jugular venous mccollum A waves [Respiration, Rhythm And Depth] : normal respiratory rhythm and effort [Exaggerated Use Of Accessory Muscles For Inspiration] : no accessory muscle use [Auscultation Breath Sounds / Voice Sounds] : lungs were clear to auscultation bilaterally [Bowel Sounds] : normal bowel sounds [Abdomen Soft] : soft [Abdomen Tenderness] : non-tender [Gait - Sufficient For Exercise Testing] : the gait was sufficient for exercise testing [Abnormal Walk] : normal gait [Nail Clubbing] : no clubbing of the fingernails [Cyanosis, Localized] : no localized cyanosis [Skin Color & Pigmentation] : normal skin color and pigmentation [Skin Turgor] : normal skin turgor [] : no rash [Oriented To Time, Place, And Person] : oriented to person, place, and time [Impaired Insight] : insight and judgment were intact [No Anxiety] : not feeling anxious [Normal Rate] : normal [Normal S1] : normal S1 [Normal S2] : normal S2 [No Murmur] : no murmurs heard [2+] : left 2+ [1+] : left 1+ [No Abnormalities] : the abdominal aorta was not enlarged and no bruit was heard [No Pitting Edema] : no pitting edema present [S3] : no S3 [S4] : no S4 [Right Carotid Bruit] : no bruit heard over the right carotid [Left Carotid Bruit] : no bruit heard over the left carotid [Right Femoral Bruit] : no bruit heard over the right femoral artery [Left Femoral Bruit] : no bruit heard over the left femoral artery

## 2020-06-12 ENCOUNTER — APPOINTMENT (OUTPATIENT)
Dept: CARDIOLOGY | Facility: CLINIC | Age: 70
End: 2020-06-12
Payer: MEDICARE

## 2020-06-12 PROCEDURE — 93880 EXTRACRANIAL BILAT STUDY: CPT

## 2020-06-25 ENCOUNTER — APPOINTMENT (OUTPATIENT)
Dept: CARDIOLOGY | Facility: CLINIC | Age: 70
End: 2020-06-25
Payer: MEDICARE

## 2020-06-25 PROCEDURE — 93015 CV STRESS TEST SUPVJ I&R: CPT

## 2020-06-30 ENCOUNTER — APPOINTMENT (OUTPATIENT)
Dept: INTERNAL MEDICINE | Facility: CLINIC | Age: 70
End: 2020-06-30
Payer: MEDICARE

## 2020-06-30 VITALS
TEMPERATURE: 98.5 F | HEIGHT: 65 IN | DIASTOLIC BLOOD PRESSURE: 78 MMHG | HEART RATE: 77 BPM | SYSTOLIC BLOOD PRESSURE: 130 MMHG | BODY MASS INDEX: 19.33 KG/M2 | RESPIRATION RATE: 14 BRPM | OXYGEN SATURATION: 97 % | WEIGHT: 116 LBS

## 2020-06-30 PROCEDURE — 36415 COLL VENOUS BLD VENIPUNCTURE: CPT

## 2020-06-30 PROCEDURE — 99214 OFFICE O/P EST MOD 30 MIN: CPT | Mod: 25

## 2020-06-30 NOTE — PHYSICAL EXAM
[No Acute Distress] : no acute distress [Well Nourished] : well nourished [Well Developed] : well developed [Well-Appearing] : well-appearing [Normal Voice/Communication] : normal voice/communication [Normal Sclera/Conjunctiva] : normal sclera/conjunctiva [EOMI] : extraocular movements intact [PERRL] : pupils equal round and reactive to light [Normal Oropharynx] : the oropharynx was normal [Normal Outer Ear/Nose] : the outer ears and nose were normal in appearance [No Lymphadenopathy] : no lymphadenopathy [No JVD] : no jugular venous distention [Thyroid Normal, No Nodules] : the thyroid was normal and there were no nodules present [Supple] : supple [Clear to Auscultation] : lungs were clear to auscultation bilaterally [No Respiratory Distress] : no respiratory distress  [No Accessory Muscle Use] : no accessory muscle use [Regular Rhythm] : with a regular rhythm [Normal Rate] : normal rate  [No Murmur] : no murmur heard [Normal S1, S2] : normal S1 and S2 [No Abdominal Bruit] : a ~M bruit was not heard ~T in the abdomen [No Carotid Bruits] : no carotid bruits [No Varicosities] : no varicosities [No Edema] : there was no peripheral edema [Pedal Pulses Present] : the pedal pulses are present [No Palpable Aorta] : no palpable aorta [No Extremity Clubbing/Cyanosis] : no extremity clubbing/cyanosis [Non Tender] : non-tender [Soft] : abdomen soft [No Masses] : no abdominal mass palpated [No HSM] : no HSM [Non-distended] : non-distended [Normal Posterior Cervical Nodes] : no posterior cervical lymphadenopathy [Normal Bowel Sounds] : normal bowel sounds [Normal Supraclavicular Nodes] : no supraclavicular lymphadenopathy [Normal Anterior Cervical Nodes] : no anterior cervical lymphadenopathy [No CVA Tenderness] : no CVA  tenderness [No Joint Swelling] : no joint swelling [Grossly Normal Strength/Tone] : grossly normal strength/tone [No Spinal Tenderness] : no spinal tenderness [Coordination Grossly Intact] : coordination grossly intact [No Focal Deficits] : no focal deficits [No Rash] : no rash [Normal Gait] : normal gait [Deep Tendon Reflexes (DTR)] : deep tendon reflexes were 2+ and symmetric [Speech Grossly Normal] : speech grossly normal [Memory Grossly Normal] : memory grossly normal [Normal Affect] : the affect was normal [Normal Mood] : the mood was normal [Alert and Oriented x3] : oriented to person, place, and time [Normal Insight/Judgement] : insight and judgment were intact

## 2020-07-01 LAB
ANION GAP SERPL CALC-SCNC: 12 MMOL/L
APTT BLD: 27.1 SEC
BASOPHILS # BLD AUTO: 0.07 K/UL
BASOPHILS NFR BLD AUTO: 0.8 %
BUN SERPL-MCNC: 25 MG/DL
CALCIUM SERPL-MCNC: 9.3 MG/DL
CHLORIDE SERPL-SCNC: 97 MMOL/L
CO2 SERPL-SCNC: 24 MMOL/L
CREAT SERPL-MCNC: 1.4 MG/DL
EOSINOPHIL # BLD AUTO: 0.19 K/UL
EOSINOPHIL NFR BLD AUTO: 2.2 %
GLUCOSE SERPL-MCNC: 101 MG/DL
HCT VFR BLD CALC: 35.2 %
HGB BLD-MCNC: 11.2 G/DL
IMM GRANULOCYTES NFR BLD AUTO: 0.4 %
INR PPP: 0.98 RATIO
LYMPHOCYTES # BLD AUTO: 1.84 K/UL
LYMPHOCYTES NFR BLD AUTO: 21.6 %
MAN DIFF?: NORMAL
MCHC RBC-ENTMCNC: 31.5 PG
MCHC RBC-ENTMCNC: 31.8 GM/DL
MCV RBC AUTO: 99.2 FL
MONOCYTES # BLD AUTO: 0.64 K/UL
MONOCYTES NFR BLD AUTO: 7.5 %
NEUTROPHILS # BLD AUTO: 5.76 K/UL
NEUTROPHILS NFR BLD AUTO: 67.5 %
PLATELET # BLD AUTO: 193 K/UL
POTASSIUM SERPL-SCNC: 5 MMOL/L
PT BLD: 11.6 SEC
RBC # BLD: 3.55 M/UL
RBC # FLD: 11.9 %
SODIUM SERPL-SCNC: 134 MMOL/L
WBC # FLD AUTO: 8.53 K/UL

## 2020-07-01 NOTE — ASSESSMENT
[No Further Testing Recommended] : no further testing recommended [Continue medications as is] : Continue current medications [Patient Optimized for Surgery] : Patient optimized for surgery [FreeTextEntry4] : cleared for rocedure

## 2020-07-01 NOTE — RESULTS/DATA
[] : results reviewed [de-identified] : normal [de-identified] : PTT 27.1 [de-identified] : b 11.2 [de-identified] : BUN/Cr 25/1.40

## 2020-07-01 NOTE — HISTORY OF PRESENT ILLNESS
[No Pertinent Cardiac History] : no history of aortic stenosis, atrial fibrillation, coronary artery disease, recent myocardial infarction, or implantable device/pacemaker [(Patient denies any chest pain, claudication, dyspnea on exertion, orthopnea, palpitations or syncope)] : Patient denies any chest pain, claudication, dyspnea on exertion, orthopnea, palpitations or syncope [Atrial Fibrillation] : no atrial fibrillation [Aortic Stenosis] : no aortic stenosis [Recent Myocardial Infarction] : no recent myocardial infarction [Coronary Artery Disease] : no coronary artery disease [Implantable Device/Pacemaker] : no implantable device/pacemaker [COPD] : no COPD [Asthma] : no asthma [Smoker] : not a smoker [Sleep Apnea] : no sleep apnea [Family Member] : no family member with adverse anesthesia reaction/sudden death [Chronic Kidney Disease] : no chronic kidney disease [Self] : no previous adverse anesthesia reaction [Chronic Anticoagulation] : no chronic anticoagulation [Diabetes] : no diabetes [FreeTextEntry2] : July 3, 2020 [FreeTextEntry3] : Dr. Nunes [FreeTextEntry1] : Colonoscopy [FreeTextEntry4] : pre Op

## 2020-07-20 ENCOUNTER — APPOINTMENT (OUTPATIENT)
Dept: INTERNAL MEDICINE | Facility: CLINIC | Age: 70
End: 2020-07-20
Payer: MEDICARE

## 2020-07-20 VITALS
OXYGEN SATURATION: 98 % | DIASTOLIC BLOOD PRESSURE: 78 MMHG | HEIGHT: 65 IN | SYSTOLIC BLOOD PRESSURE: 126 MMHG | HEART RATE: 72 BPM | WEIGHT: 117 LBS | TEMPERATURE: 97.5 F | BODY MASS INDEX: 19.49 KG/M2 | RESPIRATION RATE: 14 BRPM

## 2020-07-20 DIAGNOSIS — Z87.898 PERSONAL HISTORY OF OTHER SPECIFIED CONDITIONS: ICD-10-CM

## 2020-07-20 PROCEDURE — 99214 OFFICE O/P EST MOD 30 MIN: CPT

## 2020-07-20 NOTE — HEALTH RISK ASSESSMENT
[No falls in past year] : Patient reported no falls in the past year [No] : In the past 12 months have you used drugs other than those required for medical reasons? No [0] : 2) Feeling down, depressed, or hopeless: Not at all (0) [[id number]] : 0

## 2020-07-23 ENCOUNTER — OUTPATIENT (OUTPATIENT)
Dept: OUTPATIENT SERVICES | Facility: HOSPITAL | Age: 70
LOS: 1 days | End: 2020-07-23
Payer: MEDICARE

## 2020-07-23 ENCOUNTER — APPOINTMENT (OUTPATIENT)
Dept: CT IMAGING | Facility: CLINIC | Age: 70
End: 2020-07-23
Payer: MEDICARE

## 2020-07-23 DIAGNOSIS — Z87.898 PERSONAL HISTORY OF OTHER SPECIFIED CONDITIONS: ICD-10-CM

## 2020-07-23 DIAGNOSIS — Z96.643 PRESENCE OF ARTIFICIAL HIP JOINT, BILATERAL: Chronic | ICD-10-CM

## 2020-07-23 PROCEDURE — 74177 CT ABD & PELVIS W/CONTRAST: CPT | Mod: 26

## 2020-07-23 PROCEDURE — 74177 CT ABD & PELVIS W/CONTRAST: CPT

## 2020-07-23 PROCEDURE — 82565 ASSAY OF CREATININE: CPT

## 2020-08-03 ENCOUNTER — EMERGENCY (EMERGENCY)
Facility: HOSPITAL | Age: 70
LOS: 1 days | Discharge: HOME | End: 2020-08-03
Attending: EMERGENCY MEDICINE
Payer: MEDICARE

## 2020-08-03 VITALS
RESPIRATION RATE: 16 BRPM | OXYGEN SATURATION: 96 % | DIASTOLIC BLOOD PRESSURE: 71 MMHG | WEIGHT: 117.95 LBS | TEMPERATURE: 98 F | HEART RATE: 73 BPM | SYSTOLIC BLOOD PRESSURE: 134 MMHG | HEIGHT: 65.5 IN

## 2020-08-03 VITALS
DIASTOLIC BLOOD PRESSURE: 70 MMHG | SYSTOLIC BLOOD PRESSURE: 128 MMHG | HEART RATE: 75 BPM | RESPIRATION RATE: 20 BRPM | OXYGEN SATURATION: 99 % | TEMPERATURE: 98 F

## 2020-08-03 DIAGNOSIS — Z96.643 PRESENCE OF ARTIFICIAL HIP JOINT, BILATERAL: Chronic | ICD-10-CM

## 2020-08-03 LAB
ALBUMIN SERPL ELPH-MCNC: 4.5 G/DL — SIGNIFICANT CHANGE UP (ref 3.3–5)
ALP SERPL-CCNC: 67 U/L — SIGNIFICANT CHANGE UP (ref 40–120)
ALT FLD-CCNC: 17 U/L — SIGNIFICANT CHANGE UP (ref 10–45)
ANION GAP SERPL CALC-SCNC: 14 MMOL/L — SIGNIFICANT CHANGE UP (ref 5–17)
AST SERPL-CCNC: 26 U/L — SIGNIFICANT CHANGE UP (ref 10–40)
BASE EXCESS BLDV CALC-SCNC: 2.1 MMOL/L — HIGH (ref -2–2)
BASOPHILS # BLD AUTO: 0.05 K/UL — SIGNIFICANT CHANGE UP (ref 0–0.2)
BASOPHILS NFR BLD AUTO: 0.4 % — SIGNIFICANT CHANGE UP (ref 0–2)
BILIRUB SERPL-MCNC: 0.6 MG/DL — SIGNIFICANT CHANGE UP (ref 0.2–1.2)
BUN SERPL-MCNC: 18 MG/DL — SIGNIFICANT CHANGE UP (ref 7–23)
CA-I SERPL-SCNC: 1.2 MMOL/L — SIGNIFICANT CHANGE UP (ref 1.12–1.3)
CALCIUM SERPL-MCNC: 9.9 MG/DL — SIGNIFICANT CHANGE UP (ref 8.4–10.5)
CHLORIDE BLDV-SCNC: 101 MMOL/L — SIGNIFICANT CHANGE UP (ref 96–108)
CHLORIDE SERPL-SCNC: 96 MMOL/L — SIGNIFICANT CHANGE UP (ref 96–108)
CO2 BLDV-SCNC: 30 MMOL/L — SIGNIFICANT CHANGE UP (ref 22–30)
CO2 SERPL-SCNC: 22 MMOL/L — SIGNIFICANT CHANGE UP (ref 22–31)
CREAT SERPL-MCNC: 1.19 MG/DL — SIGNIFICANT CHANGE UP (ref 0.5–1.3)
EOSINOPHIL # BLD AUTO: 0.09 K/UL — SIGNIFICANT CHANGE UP (ref 0–0.5)
EOSINOPHIL NFR BLD AUTO: 0.7 % — SIGNIFICANT CHANGE UP (ref 0–6)
GAS PNL BLDV: 129 MMOL/L — LOW (ref 135–145)
GAS PNL BLDV: SIGNIFICANT CHANGE UP
GAS PNL BLDV: SIGNIFICANT CHANGE UP
GLUCOSE BLDV-MCNC: 104 MG/DL — HIGH (ref 70–99)
GLUCOSE SERPL-MCNC: 108 MG/DL — HIGH (ref 70–99)
HCO3 BLDV-SCNC: 28 MMOL/L — SIGNIFICANT CHANGE UP (ref 21–29)
HCT VFR BLD CALC: 36.8 % — SIGNIFICANT CHANGE UP (ref 34.5–45)
HCT VFR BLDA CALC: 39 % — SIGNIFICANT CHANGE UP (ref 39–50)
HGB BLD CALC-MCNC: 12.7 G/DL — SIGNIFICANT CHANGE UP (ref 11.5–15.5)
HGB BLD-MCNC: 12 G/DL — SIGNIFICANT CHANGE UP (ref 11.5–15.5)
HOROWITZ INDEX BLDV+IHG-RTO: SIGNIFICANT CHANGE UP
IMM GRANULOCYTES NFR BLD AUTO: 0.4 % — SIGNIFICANT CHANGE UP (ref 0–1.5)
LACTATE BLDV-MCNC: 1.1 MMOL/L — SIGNIFICANT CHANGE UP (ref 0.7–2)
LYMPHOCYTES # BLD AUTO: 1.43 K/UL — SIGNIFICANT CHANGE UP (ref 1–3.3)
LYMPHOCYTES # BLD AUTO: 11.4 % — LOW (ref 13–44)
MCHC RBC-ENTMCNC: 31.3 PG — SIGNIFICANT CHANGE UP (ref 27–34)
MCHC RBC-ENTMCNC: 32.6 GM/DL — SIGNIFICANT CHANGE UP (ref 32–36)
MCV RBC AUTO: 95.8 FL — SIGNIFICANT CHANGE UP (ref 80–100)
MONOCYTES # BLD AUTO: 0.88 K/UL — SIGNIFICANT CHANGE UP (ref 0–0.9)
MONOCYTES NFR BLD AUTO: 7 % — SIGNIFICANT CHANGE UP (ref 2–14)
NEUTROPHILS # BLD AUTO: 10.05 K/UL — HIGH (ref 1.8–7.4)
NEUTROPHILS NFR BLD AUTO: 80.1 % — HIGH (ref 43–77)
NRBC # BLD: 0 /100 WBCS — SIGNIFICANT CHANGE UP (ref 0–0)
PCO2 BLDV: 52 MMHG — HIGH (ref 35–50)
PH BLDV: 7.35 — SIGNIFICANT CHANGE UP (ref 7.35–7.45)
PLATELET # BLD AUTO: 195 K/UL — SIGNIFICANT CHANGE UP (ref 150–400)
PO2 BLDV: 22 MMHG — LOW (ref 25–45)
POTASSIUM BLDV-SCNC: 4.2 MMOL/L — SIGNIFICANT CHANGE UP (ref 3.5–5.3)
POTASSIUM SERPL-MCNC: 4.4 MMOL/L — SIGNIFICANT CHANGE UP (ref 3.5–5.3)
POTASSIUM SERPL-SCNC: 4.4 MMOL/L — SIGNIFICANT CHANGE UP (ref 3.5–5.3)
PROT SERPL-MCNC: 7.2 G/DL — SIGNIFICANT CHANGE UP (ref 6–8.3)
RBC # BLD: 3.84 M/UL — SIGNIFICANT CHANGE UP (ref 3.8–5.2)
RBC # FLD: 11.9 % — SIGNIFICANT CHANGE UP (ref 10.3–14.5)
SAO2 % BLDV: 31 % — LOW (ref 67–88)
SODIUM SERPL-SCNC: 132 MMOL/L — LOW (ref 135–145)
WBC # BLD: 12.55 K/UL — HIGH (ref 3.8–10.5)
WBC # FLD AUTO: 12.55 K/UL — HIGH (ref 3.8–10.5)

## 2020-08-03 PROCEDURE — 84132 ASSAY OF SERUM POTASSIUM: CPT

## 2020-08-03 PROCEDURE — 93005 ELECTROCARDIOGRAM TRACING: CPT

## 2020-08-03 PROCEDURE — 85014 HEMATOCRIT: CPT

## 2020-08-03 PROCEDURE — 74177 CT ABD & PELVIS W/CONTRAST: CPT | Mod: 26

## 2020-08-03 PROCEDURE — 93010 ELECTROCARDIOGRAM REPORT: CPT | Mod: GC

## 2020-08-03 PROCEDURE — 82803 BLOOD GASES ANY COMBINATION: CPT

## 2020-08-03 PROCEDURE — 82330 ASSAY OF CALCIUM: CPT

## 2020-08-03 PROCEDURE — 82435 ASSAY OF BLOOD CHLORIDE: CPT

## 2020-08-03 PROCEDURE — 82947 ASSAY GLUCOSE BLOOD QUANT: CPT

## 2020-08-03 PROCEDURE — 74177 CT ABD & PELVIS W/CONTRAST: CPT

## 2020-08-03 PROCEDURE — 85027 COMPLETE CBC AUTOMATED: CPT

## 2020-08-03 PROCEDURE — 83605 ASSAY OF LACTIC ACID: CPT

## 2020-08-03 PROCEDURE — 84295 ASSAY OF SERUM SODIUM: CPT

## 2020-08-03 PROCEDURE — 99285 EMERGENCY DEPT VISIT HI MDM: CPT | Mod: CS,GC

## 2020-08-03 PROCEDURE — 99284 EMERGENCY DEPT VISIT MOD MDM: CPT | Mod: 25

## 2020-08-03 PROCEDURE — 80053 COMPREHEN METABOLIC PANEL: CPT

## 2020-08-03 PROCEDURE — 96374 THER/PROPH/DIAG INJ IV PUSH: CPT | Mod: XU

## 2020-08-03 RX ORDER — METRONIDAZOLE 500 MG
500 TABLET ORAL ONCE
Refills: 0 | Status: DISCONTINUED | OUTPATIENT
Start: 2020-08-03 | End: 2020-08-03

## 2020-08-03 RX ORDER — CIPROFLOXACIN LACTATE 400MG/40ML
400 VIAL (ML) INTRAVENOUS ONCE
Refills: 0 | Status: DISCONTINUED | OUTPATIENT
Start: 2020-08-03 | End: 2020-08-03

## 2020-08-03 RX ORDER — SODIUM CHLORIDE 9 MG/ML
1000 INJECTION INTRAMUSCULAR; INTRAVENOUS; SUBCUTANEOUS ONCE
Refills: 0 | Status: COMPLETED | OUTPATIENT
Start: 2020-08-03 | End: 2020-08-03

## 2020-08-03 RX ORDER — PIPERACILLIN AND TAZOBACTAM 4; .5 G/20ML; G/20ML
3.38 INJECTION, POWDER, LYOPHILIZED, FOR SOLUTION INTRAVENOUS ONCE
Refills: 0 | Status: COMPLETED | OUTPATIENT
Start: 2020-08-03 | End: 2020-08-03

## 2020-08-03 RX ADMIN — PIPERACILLIN AND TAZOBACTAM 200 GRAM(S): 4; .5 INJECTION, POWDER, LYOPHILIZED, FOR SOLUTION INTRAVENOUS at 15:44

## 2020-08-03 RX ADMIN — SODIUM CHLORIDE 1000 MILLILITER(S): 9 INJECTION INTRAMUSCULAR; INTRAVENOUS; SUBCUTANEOUS at 12:04

## 2020-08-03 NOTE — ED PROVIDER NOTE - PATIENT PORTAL LINK FT
You can access the FollowMyHealth Patient Portal offered by [facility] by registering at the following website: [email or web address]. By joining [employer]’s FollowMyHealth portal, you will also be able to view your health information using other applications (apps) compatible with our system.

## 2020-08-03 NOTE — ED ADULT NURSE NOTE - CHPI ED NUR SYMPTOMS NEG
no dysuria/no nausea/no burning urination/no chills/no hematuria/no abdominal distension/no blood in stool/no fever/no vomiting

## 2020-08-03 NOTE — ED PROVIDER NOTE - NS ED ROS FT
Constitutional: no fevers, chills, weight loss  HEENT: no cough, congestion, rhinorrhea, dysphagia, lymphadenopathy  Cardiac: no chest pain, palpitations  Respiratory: no SOB, hematemesis  GI: no dark stools +bloody stools,   : no dysuria, frequency, or hematuria  MSK: no joint pain  Skin: no rashes  Neuro: no headache, change in vision, weakness, facial droop  Psych: no SI/HI, hallucinations Constitutional: no fevers, chills, weight loss  HEENT: no cough, congestion, rhinorrhea, dysphagia, lymphadenopathy  Cardiac: no chest pain, palpitations  Respiratory: no SOB, hematemesis  GI: no dark stools +bloody stools, +lower abdominal pain +loose stools +vomiting  : no dysuria, frequency, or hematuria -vaginal bleeding  MSK: no joint pain  Skin: no rashes  Neuro: no headache, change in vision, weakness, facial droop  Psych: no SI/HI, hallucinations

## 2020-08-03 NOTE — ED PROVIDER NOTE - OBJECTIVE STATEMENT
68yo F hx of HTN comes to ED for 1 day of BRBPR. Had 12 loose bowel movements yesterday w/ blood. No dark tarry stools. +lower abdominal pain 70yo F hx of HTN comes to ED for 1 day of BRBPR. Had 12 loose bowel movements yesterday w/ blood. No dark tarry stools. Had nausea and 1 episode of non-bilious, non-bloody vomiting. Mild lower abdominal pain this past month. CT scan 10 days ago showed no acute findings. Pt otherwise denies f/c, cp, sob, sick contacts, vaginal bleeding, or blood in urine. Symptoms reportedly impr    No abdominal surgeries. Per patient recent colonoscopy showed no polyps but many internal hemorrhoids. 70yo F hx of HTN comes to ED for 1 day of BRBPR. Had 12 loose bowel movements yesterday w/ blood. No dark tarry stools. Had nausea and 1 episode of non-bilious, non-bloody vomiting. Mild lower abdominal pain this past month. CT scan 10 days ago showed no acute findings. Pt otherwise denies f/c, lightheadedness, cp, sob, sick contacts, vaginal bleeding, or blood in urine. Symptoms reportedly impr    No abdominal surgeries. Per patient recent colonoscopy showed no polyps but many internal hemorrhoids. 70yo F hx of HTN comes to ED for 1 day of BRBPR. Had 12 loose bowel movements yesterday w/ blood. No dark tarry stools. Had nausea and 1 episode of non-bilious, non-bloody vomiting. Mild lower abdominal pain this past month. CT scan 10 days ago showed no acute findings. Pt otherwise denies f/c, lightheadedness, cp, sob, sick contacts, vaginal bleeding, or blood in urine. Symptoms reportedly improving from yesterday.    No abdominal surgeries. Per patient recent colonoscopy showed no polyps but many internal hemorrhoids.

## 2020-08-03 NOTE — ED ADULT NURSE NOTE - OBJECTIVE STATEMENT
68 yo F aaox4 C/o of rectal bleed onset 1 day ago. PMH Hemorrhoids. Pt reporting abd cramping, and blood in stool when she has bowel movement. Recently had a colonoscopy done 1 mth ago, with no significant findings.

## 2020-08-03 NOTE — ED PROVIDER NOTE - PHYSICAL EXAMINATION
General: well-appearing, non-toxic, no acute distress  HEENT: NCAT, PERRLA, EOMI, no lymphadenopathy  Cardiac: RRR, no murmurs, rubs, or gallops, 2+ peripheral pulses  Chest: CTA, bilateral breath sounds equal  Abdomen: soft, non-distended, no TTP, bowel sounds present  Rectal: Chaperoned by Dr. Sultana -external hemorrhoids +palpable internal hemorrhoids - small amount of brown stool in rectum, no blood  Extremities: no deformities, no peripheral edema  Skin: no rashes  Neuro: AAOx3, motor and sensory grossly intact  Psych: mood and affect appropriate

## 2020-08-03 NOTE — ED ADULT NURSE NOTE - EXTENSIONS OF SELF_ADULT
Total Body Energy: 745mj Irradiance (Optional- Include Units): 4. 24 Consent: Written consent obtained. The risks were reviewed with the patient including but not limited to: burn, pigmentary changes, pain, blistering, scabbing, redness, increased risk of skin cancers, and the remote possibility of scarring. Protocol For Nb Uva: The patient received NB UVA. Detail Level: Detailed Protocol For Photochemotherapy For Severe Photoresponsive Dermatoses: Tar And Nbuvb (Goeckerman Treatment): The patient received Photochemotherapy for severe photoresponsive dermatoses: Tar and NBUVB (Goeckerman treatment) requiring at least 4 to 8 hours of care under direct physician supervision. Protocol For Broad Band Uvb: The patient received Broad Band UVB. Protocol For Photochemotherapy For Severe Photoresponsive Dermatoses: Petrolatum And Nbuvb: The patient received Photochemotherapy for severe photoresponsive dermatoses: Petrolatum and NBUVB requiring at least 4 to 8 hours of care under direct physician supervision. Protocol For Photochemotherapy For Severe Photoresponsive Dermatoses: Tar And Broad Band Uvb (Goeckerman Treatment): The patient received Photochemotherapy for severe photoresponsive dermatoses: Tar and Broad Band UVB (Goeckerman treatment) requiring at least 4 to 8 hours of care under direct physician supervision. Protocol For Photochemotherapy: Petrolatum And Nbuvb: The patient received Photochemotherapy: Petrolatum and NBUVB (petrolatum applied to all lesions prior to phototherapy). Protocol For Nbuvb: The patient received NBUVB. Protocol For Photochemotherapy: Baby Oil And Nbuvb: The patient received Photochemotherapy: Baby Oil and NBUVB (baby oil applied to all lesions prior to phototherapy). Eyeglasses Post-Care Instructions: I [email or web address] with the patient in detail post-care instructions. Patient is to wear sun protection. Patients may expect sunburn like redness, discomfort and scabbing. Protocol For Bath Puva: The patient received Bath PUVA. Total Body Time: 2:56 Protocol For Protocol For Photochemotherapy For Severe Photoresponsive Dermatoses: Bath Puva: The patient received Photochemotherapy for severe photoresponsive dermatoses: Bath PUVA requiring at least 4 to 8 hours of care under direct physician supervision. Name Of Supervising Technician: Evan Skin Type: I Protocol For Photochemotherapy: Triamcinolone Ointment And Nbuvb: The patient received Photochemotherapy: Triamcinolone and NBUVB (triamcinolone ointment applied to all lesions prior to phototherapy). Comments On Previous Treatment: Pt complaints of redness, burning, blistering, scabbing or any discomfort. Total Treatment Time: 10 mins Treatment Number: May Protocol For Photochemotherapy: Petrolatum And Broad Band Uvb: The patient received Photochemotherapy: Petrolatum and Broad Band UVB. Changes In Treatment Protocol: Skin Type II.\\nMay ^ 25mJ as tolerated by patient.  Decreased to 745mj due to missed appointments.//////PJ/////// Protocol For Photochemotherapy For Severe Photoresponsive Dermatoses: Puva: The patient received Photochemotherapy for severe photoresponsive dermatoses: PUVA requiring at least 4 to 8 hours of care under direct physician supervision. Protocol For Photochemotherapy: Mineral Oil And Nbuvb: The patient received Photochemotherapy: Mineral Oil and NBUVB (mineral oil applied to all lesions prior to phototherapy). Protocol: Photochemotherapy: Mineral Oil and NBUVB Protocol For Photochemotherapy For Severe Photoresponsive Dermatoses: Petrolatum And Broad Band Uvb: The patient received Photochemotherapyfor severe photoresponsive dermatoses: Petrolatum and Broad Band UVB requiring at least 4 to 8 hours of care under direct physician supervision. Protocol For Photochemotherapy: Tar And Nbuvb (Goeckerman Treatment): The patient received Photochemotherapy: Tar and NBUVB (Goeckerman treatment). Protocol For Photochemotherapy: Tar And Broad Band Uvb (Goeckerman Treatment): The patient received Photochemotherapy: Tar and Broad Band UVB (Goeckerman treatment). Protocol For Puva: The patient received PUVA.

## 2020-08-03 NOTE — ED PROVIDER NOTE - CLINICAL SUMMARY MEDICAL DECISION MAKING FREE TEXT BOX
68yo F w/ non-contributory medical hx comes to ED for bloody stools. vitals WNL. Exam reveals internal hemorrhoids but unremarkable abdominal exam. Patient's symptoms appear resolved currently and no complaints related to symptomatic anemia. Labs and repeat CT scan. Bleeding is likely due to internal hemorrhoids but differential includes diverticulosis and angiodysplasia. Malignancy unlikely given normal colonoscopy. Infectious causes also unlikely given resolution of symptoms, and benign abdominal exam. Likely home w/ GI f/u.

## 2020-08-03 NOTE — ED ADULT NURSE NOTE - NSIMPLEMENTINTERV_GEN_ALL_ED
Implemented All Universal Safety Interventions:  [place] to call system. Call bell, personal items and telephone within reach. Instruct patient to call for assistance. Room bathroom lighting operational. Non-slip footwear when patient is off stretcher. Physically safe environment: no spills, clutter or unnecessary equipment. Stretcher in lowest position, wheels locked, appropriate side rails in place.

## 2020-08-03 NOTE — ED PROVIDER NOTE - ATTENDING CONTRIBUTION TO CARE
RGUJRAL 70yo f hx HTN presents with diarrhea x 24 hours. Patient reports about 12 watery bowel movements associated with bright red blood. States she has abdominal cramps followed by BM and it relieves some pain. No fever, chills, recent abx or exposure to illness. Denies any lightheadedness, chest pain. Patient had a colonoscopy 1 mo ago, + hemorrhoid. CT on 7/23 w moderate stool, no symptoms at that time.   On exam, Patient is awake, alert and oriented x 3.  Patient is well appearing and in no acute distress.  NCAT, PERRL, EOMI.  Neck is supple, No LAD.  Lungs are CTA B/L,+S1S2 no murmurs,  Abdomen:Soft nd/mild b/l lower quadrant and suprapubic tenderness +bs no rebound or guarding.  Extremity no edema or calf tender.  Skin with no rash.  Neuro CN3-12 intact. Strength 5/5 in upper and lower extremities. Nml Sensation.Gait normal.   Check Labs, CT to eval for colitis and eval for anemia.

## 2020-08-03 NOTE — ED PROVIDER NOTE - NSFOLLOWUPCLINICS_GEN_ALL_ED_FT
[facility]  Gastroenterology  [address] [place] [id number]  Phone: [phone]  Fax:   Follow Up Time:

## 2020-08-03 NOTE — ED PROVIDER NOTE - NSFOLLOWUPINSTRUCTIONS_ED_ALL_ED_FT
You were seen in the Emergency Department for bloody diarrhea. You were found to have colitis, an infection of your colon. You received 1 dose of antibiotics here and we will send you home with a course of Augmentin. Please follow up with your primary care doctor and the GI specialist.        1) Advance activity as tolerated.    2) Continue all previously prescribed medications as directed.    3) Follow up with your primary care physician in 24-48 hours - take copies of your results.    4) Return to the Emergency Department for worsening or persistent symptoms, and/or ANY NEW OR CONCERNING SYMPTOMS.

## 2020-08-10 ENCOUNTER — APPOINTMENT (OUTPATIENT)
Dept: INTERNAL MEDICINE | Facility: CLINIC | Age: 70
End: 2020-08-10
Payer: MEDICARE

## 2020-08-10 VITALS
DIASTOLIC BLOOD PRESSURE: 80 MMHG | WEIGHT: 117 LBS | BODY MASS INDEX: 19.47 KG/M2 | SYSTOLIC BLOOD PRESSURE: 144 MMHG | RESPIRATION RATE: 14 BRPM | HEART RATE: 75 BPM | TEMPERATURE: 98.1 F | OXYGEN SATURATION: 98 %

## 2020-08-10 PROCEDURE — 99214 OFFICE O/P EST MOD 30 MIN: CPT

## 2020-08-10 NOTE — PHYSICAL EXAM
[No Acute Distress] : no acute distress [Well Developed] : well developed [Well Nourished] : well nourished [PERRL] : pupils equal round and reactive to light [Well-Appearing] : well-appearing [Normal Sclera/Conjunctiva] : normal sclera/conjunctiva [Normal Outer Ear/Nose] : the outer ears and nose were normal in appearance [EOMI] : extraocular movements intact [No JVD] : no jugular venous distention [Normal Oropharynx] : the oropharynx was normal [No Lymphadenopathy] : no lymphadenopathy [Supple] : supple [No Respiratory Distress] : no respiratory distress  [Thyroid Normal, No Nodules] : the thyroid was normal and there were no nodules present [No Accessory Muscle Use] : no accessory muscle use [Normal Rate] : normal rate  [Regular Rhythm] : with a regular rhythm [Clear to Auscultation] : lungs were clear to auscultation bilaterally [No Murmur] : no murmur heard [Normal S1, S2] : normal S1 and S2 [No Carotid Bruits] : no carotid bruits [No Varicosities] : no varicosities [Pedal Pulses Present] : the pedal pulses are present [No Abdominal Bruit] : a ~M bruit was not heard ~T in the abdomen [No Edema] : there was no peripheral edema [No Palpable Aorta] : no palpable aorta [Soft] : abdomen soft [No Extremity Clubbing/Cyanosis] : no extremity clubbing/cyanosis [Non-distended] : non-distended [No Masses] : no abdominal mass palpated [No HSM] : no HSM [Normal Bowel Sounds] : normal bowel sounds [Normal Supraclavicular Nodes] : no supraclavicular lymphadenopathy [Normal Posterior Cervical Nodes] : no posterior cervical lymphadenopathy [Normal Anterior Cervical Nodes] : no anterior cervical lymphadenopathy [No CVA Tenderness] : no CVA  tenderness [No Spinal Tenderness] : no spinal tenderness [No Joint Swelling] : no joint swelling [Grossly Normal Strength/Tone] : grossly normal strength/tone [No Rash] : no rash [Coordination Grossly Intact] : coordination grossly intact [No Focal Deficits] : no focal deficits [Normal Gait] : normal gait [Speech Grossly Normal] : speech grossly normal [Memory Grossly Normal] : memory grossly normal [Normal Affect] : the affect was normal [Alert and Oriented x3] : oriented to person, place, and time [Normal Mood] : the mood was normal [Normal Insight/Judgement] : insight and judgment were intact [de-identified] : lower abdominal tenderness mild

## 2020-08-10 NOTE — HISTORY OF PRESENT ILLNESS
[FreeTextEntry1] : Has colitis on Flagyl and Bactrim has nausea\par no blood per rectum\par still has some abdominal cramps

## 2020-09-02 ENCOUNTER — APPOINTMENT (OUTPATIENT)
Dept: GASTROENTEROLOGY | Facility: CLINIC | Age: 70
End: 2020-09-02
Payer: MEDICARE

## 2020-09-02 VITALS
DIASTOLIC BLOOD PRESSURE: 81 MMHG | WEIGHT: 119 LBS | HEIGHT: 65 IN | BODY MASS INDEX: 19.83 KG/M2 | OXYGEN SATURATION: 98 % | HEART RATE: 71 BPM | SYSTOLIC BLOOD PRESSURE: 172 MMHG

## 2020-09-02 DIAGNOSIS — K52.9 NONINFECTIVE GASTROENTERITIS AND COLITIS, UNSPECIFIED: ICD-10-CM

## 2020-09-02 DIAGNOSIS — K58.9 IRRITABLE BOWEL SYNDROME W/OUT DIARRHEA: ICD-10-CM

## 2020-09-02 DIAGNOSIS — Z87.19 PERSONAL HISTORY OF OTHER DISEASES OF THE DIGESTIVE SYSTEM: ICD-10-CM

## 2020-09-02 DIAGNOSIS — K59.09 OTHER CONSTIPATION: ICD-10-CM

## 2020-09-02 PROCEDURE — 36415 COLL VENOUS BLD VENIPUNCTURE: CPT

## 2020-09-02 PROCEDURE — 99202 OFFICE O/P NEW SF 15 MIN: CPT | Mod: 25

## 2020-09-02 RX ORDER — SULFAMETHOXAZOLE AND TRIMETHOPRIM 400; 80 MG/1; MG/1
400-80 TABLET ORAL TWICE DAILY
Qty: 14 | Refills: 0 | Status: DISCONTINUED | COMMUNITY
Start: 2020-08-04 | End: 2020-09-02

## 2020-09-02 RX ORDER — METHYLPREDNISOLONE 4 MG/1
4 TABLET ORAL
Qty: 1 | Refills: 0 | Status: DISCONTINUED | COMMUNITY
Start: 2020-08-19 | End: 2020-09-02

## 2020-09-02 RX ORDER — METRONIDAZOLE 500 MG/1
500 TABLET ORAL 3 TIMES DAILY
Qty: 30 | Refills: 0 | Status: DISCONTINUED | COMMUNITY
Start: 2020-08-04 | End: 2020-09-02

## 2020-09-02 RX ORDER — ONDANSETRON 8 MG/1
8 TABLET ORAL EVERY 8 HOURS
Qty: 10 | Refills: 0 | Status: DISCONTINUED | COMMUNITY
Start: 2020-08-04 | End: 2020-09-02

## 2020-09-02 NOTE — REASON FOR VISIT
[Initial Evaluation] : an initial evaluation [FreeTextEntry1] : Recent ER admission with abdominal pain CAT scan showing what was probably ischemic colitis of the splenic flexure and left colon\par \par , Underlying irritable bowel

## 2020-09-02 NOTE — PHYSICAL EXAM
[General Appearance - Alert] : alert [General Appearance - In No Acute Distress] : in no acute distress [General Appearance - Well Nourished] : well nourished [General Appearance - Well-Appearing] : healthy appearing [General Appearance - Well Developed] : well developed [Sclera] : the sclera and conjunctiva were normal [Jugular Venous Distention Increased] : there was no jugular-venous distention [Neck Appearance] : the appearance of the neck was normal [Neck Cervical Mass (___cm)] : no neck mass was observed [Auscultation Breath Sounds / Voice Sounds] : lungs were clear to auscultation bilaterally [Full Pulse] : the pedal pulses are present [Apical Impulse] : the apical impulse was normal [Edema] : there was no peripheral edema [Abdomen Soft] : soft [Bowel Sounds] : normal bowel sounds [Abdomen Tenderness] : non-tender [Abdomen Mass (___ Cm)] : no abdominal mass palpated [FreeTextEntry1] : Form scar from previous appendectomy without evidence of hernia [Cervical Lymph Nodes Enlarged Posterior Bilaterally] : posterior cervical [Cervical Lymph Nodes Enlarged Anterior Bilaterally] : anterior cervical [Supraclavicular Lymph Nodes Enlarged Bilaterally] : supraclavicular [Inguinal Lymph Nodes Enlarged Bilaterally] : inguinal [Axillary Lymph Nodes Enlarged Bilaterally] : axillary [Femoral Lymph Nodes Enlarged Bilaterally] : femoral [Abnormal Walk] : normal gait [No CVA Tenderness] : no ~M costovertebral angle tenderness [No Spinal Tenderness] : no spinal tenderness [Motor Tone] : muscle strength and tone were normal [Nail Clubbing] : no clubbing  or cyanosis of the fingernails [Musculoskeletal - Swelling] : no joint swelling seen [Skin Color & Pigmentation] : normal skin color and pigmentation [] : no rash [Skin Turgor] : normal skin turgor [No Focal Deficits] : no focal deficits [Impaired Insight] : insight and judgment were intact [Affect] : the affect was normal [Oriented To Time, Place, And Person] : oriented to person, place, and time

## 2020-09-02 NOTE — ASSESSMENT
[FreeTextEntry1] : Impression,\par \par Probable chronic mild irritable bowel\par \par Isolated episode of more significant discomfort with CAT scan showing inflammatory changes of the splenic flexure and left colon, probably from ischemic colitis\par \par Suggest,\par \par Baby aspirin once a day\par \par Try to avoid dehydration\par \par Colonoscopy was recently done, patient to sign record release to have copy sent for our review\par \par Patient is encouraged to call me to follow-up at any time\par \par Use high Bentyl as needed\par \par Consider other antispasmodic such as Levsin, Librax, though she is already fairly sedated with the medication she is on and only using it very liberally\par \par Patient if symptoms continue can also give further thought to the low-dose Zoloft or another medication\par \par

## 2020-09-03 LAB
ALBUMIN SERPL ELPH-MCNC: 4.9 G/DL
ALP BLD-CCNC: 70 U/L
ALT SERPL-CCNC: 20 U/L
ANION GAP SERPL CALC-SCNC: 14 MMOL/L
AST SERPL-CCNC: 28 U/L
BASOPHILS # BLD AUTO: 0.09 K/UL
BASOPHILS NFR BLD AUTO: 1.1 %
BILIRUB SERPL-MCNC: 0.2 MG/DL
BUN SERPL-MCNC: 25 MG/DL
CALCIUM SERPL-MCNC: 9.6 MG/DL
CHLORIDE SERPL-SCNC: 97 MMOL/L
CO2 SERPL-SCNC: 23 MMOL/L
CREAT SERPL-MCNC: 1.42 MG/DL
ENDOMYSIUM IGA SER QL: NEGATIVE
ENDOMYSIUM IGA TITR SER: NORMAL
EOSINOPHIL # BLD AUTO: 0.22 K/UL
EOSINOPHIL NFR BLD AUTO: 2.8 %
FERRITIN SERPL-MCNC: 115 NG/ML
FOLATE SERPL-MCNC: 18 NG/ML
GLIADIN IGA SER QL: <5 UNITS
GLIADIN IGG SER QL: <5 UNITS
GLIADIN PEPTIDE IGA SER-ACNC: NEGATIVE
GLIADIN PEPTIDE IGG SER-ACNC: NEGATIVE
GLUCOSE SERPL-MCNC: 88 MG/DL
HCT VFR BLD CALC: 34.6 %
HGB BLD-MCNC: 11.3 G/DL
IGA SER QL IEP: 183 MG/DL
IMM GRANULOCYTES NFR BLD AUTO: 0.3 %
IRON SATN MFR SERPL: 23 %
IRON SERPL-MCNC: 67 UG/DL
LYMPHOCYTES # BLD AUTO: 2.45 K/UL
LYMPHOCYTES NFR BLD AUTO: 31.2 %
MAN DIFF?: NORMAL
MCHC RBC-ENTMCNC: 31.2 PG
MCHC RBC-ENTMCNC: 32.7 GM/DL
MCV RBC AUTO: 95.6 FL
MONOCYTES # BLD AUTO: 0.62 K/UL
MONOCYTES NFR BLD AUTO: 7.9 %
NEUTROPHILS # BLD AUTO: 4.46 K/UL
NEUTROPHILS NFR BLD AUTO: 56.7 %
PLATELET # BLD AUTO: 195 K/UL
POTASSIUM SERPL-SCNC: 4.9 MMOL/L
PROT SERPL-MCNC: 7.2 G/DL
RBC # BLD: 3.62 M/UL
RBC # FLD: 11.7 %
SODIUM SERPL-SCNC: 134 MMOL/L
TIBC SERPL-MCNC: 290 UG/DL
TTG IGA SER IA-ACNC: <1.2 U/ML
TTG IGA SER-ACNC: NEGATIVE
TTG IGG SER IA-ACNC: <1.2 U/ML
TTG IGG SER IA-ACNC: NEGATIVE
UIBC SERPL-MCNC: 223 UG/DL
VIT B12 SERPL-MCNC: 441 PG/ML
WBC # FLD AUTO: 7.86 K/UL

## 2020-09-08 LAB — CAROTENE SERPL-MCNC: 62 MCG/DL

## 2020-09-10 LAB
ANTI ENDOMYSIAL IGA IFA: NEGATIVE
ANTI HUMAN TISSUE TRANSGLUTAMINASE IGA ELISA: < 1.9
DEAMIDATED GLIADIN ANTIBODY IGG: < 2.8
DEAMIDATED GLIADIN PEPTIDE IGA: < 5.2
PROMETHEUS CELIAC GENETICS: NORMAL
PROMETHEUS CELIAC SEROLOGY: NORMAL
PROMETHEUS LABORATORY FOOTER: NORMAL
TOTAL SERUM IGA BY NEPHELOMETRY: 216 MG/DL
TTG IGG SER IA-ACNC: NORMAL

## 2020-10-14 ENCOUNTER — APPOINTMENT (OUTPATIENT)
Dept: INTERNAL MEDICINE | Facility: CLINIC | Age: 70
End: 2020-10-14
Payer: MEDICARE

## 2020-10-14 VITALS
SYSTOLIC BLOOD PRESSURE: 132 MMHG | OXYGEN SATURATION: 98 % | DIASTOLIC BLOOD PRESSURE: 82 MMHG | RESPIRATION RATE: 14 BRPM | HEART RATE: 74 BPM | HEIGHT: 65 IN

## 2020-10-14 PROCEDURE — 36415 COLL VENOUS BLD VENIPUNCTURE: CPT

## 2020-10-14 PROCEDURE — G0008: CPT

## 2020-10-14 PROCEDURE — 99214 OFFICE O/P EST MOD 30 MIN: CPT | Mod: 25

## 2020-10-14 PROCEDURE — 90662 IIV NO PRSV INCREASED AG IM: CPT

## 2020-10-14 NOTE — PHYSICAL EXAM
[No Acute Distress] : no acute distress [Well Nourished] : well nourished [Well Developed] : well developed [Well-Appearing] : well-appearing [Normal Voice/Communication] : normal voice/communication [Normal Sclera/Conjunctiva] : normal sclera/conjunctiva [PERRL] : pupils equal round and reactive to light [EOMI] : extraocular movements intact [Normal Outer Ear/Nose] : the outer ears and nose were normal in appearance [Normal Oropharynx] : the oropharynx was normal [No Lymphadenopathy] : no lymphadenopathy [No JVD] : no jugular venous distention [Supple] : supple [Thyroid Normal, No Nodules] : the thyroid was normal and there were no nodules present [No Accessory Muscle Use] : no accessory muscle use [No Respiratory Distress] : no respiratory distress  [Clear to Auscultation] : lungs were clear to auscultation bilaterally [Normal S1, S2] : normal S1 and S2 [Normal Rate] : normal rate  [Regular Rhythm] : with a regular rhythm [No Murmur] : no murmur heard [No Abdominal Bruit] : a ~M bruit was not heard ~T in the abdomen [No Carotid Bruits] : no carotid bruits [No Edema] : there was no peripheral edema [No Varicosities] : no varicosities [Pedal Pulses Present] : the pedal pulses are present [No Palpable Aorta] : no palpable aorta [No Extremity Clubbing/Cyanosis] : no extremity clubbing/cyanosis [Soft] : abdomen soft [Non Tender] : non-tender [Non-distended] : non-distended [No Masses] : no abdominal mass palpated [No HSM] : no HSM [Normal Bowel Sounds] : normal bowel sounds [Normal Supraclavicular Nodes] : no supraclavicular lymphadenopathy [Normal Posterior Cervical Nodes] : no posterior cervical lymphadenopathy [Normal Anterior Cervical Nodes] : no anterior cervical lymphadenopathy [No CVA Tenderness] : no CVA  tenderness [No Spinal Tenderness] : no spinal tenderness [No Joint Swelling] : no joint swelling [Grossly Normal Strength/Tone] : grossly normal strength/tone [Coordination Grossly Intact] : coordination grossly intact [No Rash] : no rash [No Focal Deficits] : no focal deficits [Speech Grossly Normal] : speech grossly normal [Normal Gait] : normal gait [Normal Affect] : the affect was normal [Memory Grossly Normal] : memory grossly normal [Alert and Oriented x3] : oriented to person, place, and time [Normal Insight/Judgement] : insight and judgment were intact [Normal Mood] : the mood was normal

## 2020-10-14 NOTE — HEALTH RISK ASSESSMENT
[No] : In the past 12 months have you used drugs other than those required for medical reasons? No [No falls in past year] : Patient reported no falls in the past year [0] : 2) Feeling down, depressed, or hopeless: Not at all (0) [] : No [[id number]] : 0

## 2020-10-20 DIAGNOSIS — R92.2 INCONCLUSIVE MAMMOGRAM: ICD-10-CM

## 2020-10-20 LAB
ALBUMIN SERPL ELPH-MCNC: 4.4 G/DL
ALP BLD-CCNC: 77 U/L
ALT SERPL-CCNC: 17 U/L
ANION GAP SERPL CALC-SCNC: 12 MMOL/L
AST SERPL-CCNC: 25 U/L
BILIRUB SERPL-MCNC: <0.2 MG/DL
BUN SERPL-MCNC: 30 MG/DL
CALCIUM SERPL-MCNC: 9.1 MG/DL
CHLORIDE SERPL-SCNC: 96 MMOL/L
CO2 SERPL-SCNC: 24 MMOL/L
CREAT SERPL-MCNC: 1.6 MG/DL
ESTIMATED AVERAGE GLUCOSE: 120 MG/DL
GLUCOSE SERPL-MCNC: 99 MG/DL
HBA1C MFR BLD HPLC: 5.8 %
POTASSIUM SERPL-SCNC: 5.3 MMOL/L
PROT SERPL-MCNC: 6.6 G/DL
SODIUM SERPL-SCNC: 133 MMOL/L

## 2020-11-21 ENCOUNTER — APPOINTMENT (OUTPATIENT)
Dept: ULTRASOUND IMAGING | Facility: CLINIC | Age: 70
End: 2020-11-21
Payer: MEDICARE

## 2020-11-21 ENCOUNTER — OUTPATIENT (OUTPATIENT)
Dept: OUTPATIENT SERVICES | Facility: HOSPITAL | Age: 70
LOS: 1 days | End: 2020-11-21
Payer: MEDICARE

## 2020-11-21 DIAGNOSIS — Z96.643 PRESENCE OF ARTIFICIAL HIP JOINT, BILATERAL: Chronic | ICD-10-CM

## 2020-11-21 DIAGNOSIS — Z00.8 ENCOUNTER FOR OTHER GENERAL EXAMINATION: ICD-10-CM

## 2020-11-21 PROCEDURE — 76770 US EXAM ABDO BACK WALL COMP: CPT

## 2020-11-21 PROCEDURE — 76770 US EXAM ABDO BACK WALL COMP: CPT | Mod: 26

## 2020-12-28 ENCOUNTER — APPOINTMENT (OUTPATIENT)
Dept: INTERNAL MEDICINE | Facility: CLINIC | Age: 70
End: 2020-12-28
Payer: MEDICARE

## 2020-12-28 VITALS
TEMPERATURE: 97.4 F | HEIGHT: 65 IN | WEIGHT: 117 LBS | SYSTOLIC BLOOD PRESSURE: 142 MMHG | DIASTOLIC BLOOD PRESSURE: 78 MMHG | HEART RATE: 78 BPM | BODY MASS INDEX: 19.49 KG/M2 | RESPIRATION RATE: 14 BRPM | OXYGEN SATURATION: 98 %

## 2020-12-28 DIAGNOSIS — M54.12 RADICULOPATHY, CERVICAL REGION: ICD-10-CM

## 2020-12-28 DIAGNOSIS — N28.9 DISORDER OF KIDNEY AND URETER, UNSPECIFIED: ICD-10-CM

## 2020-12-28 PROCEDURE — 99214 OFFICE O/P EST MOD 30 MIN: CPT

## 2020-12-28 NOTE — HEALTH RISK ASSESSMENT
[Yes] : Yes [Monthly or less (1 pt)] : Monthly or less (1 point) [No] : In the past 12 months have you used drugs other than those required for medical reasons? No [No falls in past year] : Patient reported no falls in the past year [0] : 2) Feeling down, depressed, or hopeless: Not at all (0) [] : No [[id number]] : 0

## 2020-12-28 NOTE — PHYSICAL EXAM
[No Acute Distress] : no acute distress [Well Nourished] : well nourished [Well Developed] : well developed [Well-Appearing] : well-appearing [Normal Voice/Communication] : normal voice/communication [Normal Sclera/Conjunctiva] : normal sclera/conjunctiva [PERRL] : pupils equal round and reactive to light [EOMI] : extraocular movements intact [Normal Outer Ear/Nose] : the outer ears and nose were normal in appearance [Normal Oropharynx] : the oropharynx was normal [No JVD] : no jugular venous distention [No Lymphadenopathy] : no lymphadenopathy [Supple] : supple [Thyroid Normal, No Nodules] : the thyroid was normal and there were no nodules present [No Respiratory Distress] : no respiratory distress  [No Accessory Muscle Use] : no accessory muscle use [Clear to Auscultation] : lungs were clear to auscultation bilaterally [Normal Rate] : normal rate  [Regular Rhythm] : with a regular rhythm [Normal S1, S2] : normal S1 and S2 [No Murmur] : no murmur heard [No Carotid Bruits] : no carotid bruits [No Abdominal Bruit] : a ~M bruit was not heard ~T in the abdomen [No Varicosities] : no varicosities [Pedal Pulses Present] : the pedal pulses are present [No Edema] : there was no peripheral edema [No Palpable Aorta] : no palpable aorta [No Extremity Clubbing/Cyanosis] : no extremity clubbing/cyanosis [Soft] : abdomen soft [Non Tender] : non-tender [Non-distended] : non-distended [No Masses] : no abdominal mass palpated [No HSM] : no HSM [Normal Bowel Sounds] : normal bowel sounds [Normal Posterior Cervical Nodes] : no posterior cervical lymphadenopathy [Normal Anterior Cervical Nodes] : no anterior cervical lymphadenopathy [No CVA Tenderness] : no CVA  tenderness [No Spinal Tenderness] : no spinal tenderness [No Joint Swelling] : no joint swelling [Grossly Normal Strength/Tone] : grossly normal strength/tone [No Rash] : no rash [Coordination Grossly Intact] : coordination grossly intact [No Focal Deficits] : no focal deficits [Normal Gait] : normal gait [Speech Grossly Normal] : speech grossly normal [Memory Grossly Normal] : memory grossly normal [Normal Affect] : the affect was normal [Alert and Oriented x3] : oriented to person, place, and time [Normal Mood] : the mood was normal [Normal Insight/Judgement] : insight and judgment were intact [de-identified] : left sided neck tenderenss

## 2020-12-28 NOTE — HISTORY OF PRESENT ILLNESS
[FreeTextEntry8] : has back pains stating in neck for three days radiates\par recently saw Nephrologist

## 2021-04-02 ENCOUNTER — APPOINTMENT (OUTPATIENT)
Dept: INTERNAL MEDICINE | Facility: CLINIC | Age: 71
End: 2021-04-02
Payer: MEDICARE

## 2021-04-02 ENCOUNTER — RESULT REVIEW (OUTPATIENT)
Age: 71
End: 2021-04-02

## 2021-04-02 VITALS
BODY MASS INDEX: 19.16 KG/M2 | WEIGHT: 115 LBS | DIASTOLIC BLOOD PRESSURE: 80 MMHG | SYSTOLIC BLOOD PRESSURE: 160 MMHG | HEIGHT: 65 IN | OXYGEN SATURATION: 97 % | TEMPERATURE: 97 F | HEART RATE: 74 BPM | RESPIRATION RATE: 14 BRPM

## 2021-04-02 DIAGNOSIS — R11.2 NAUSEA WITH VOMITING, UNSPECIFIED: ICD-10-CM

## 2021-04-02 PROCEDURE — 99214 OFFICE O/P EST MOD 30 MIN: CPT

## 2021-04-02 NOTE — HISTORY OF PRESENT ILLNESS
[FreeTextEntry1] : follow up \par Stomach pain, nausea [de-identified] : LAINE MOHAN is a 70 year old F who presents today for follow up for her BP. Pt reduced Losartan to 50 MG. Complains of stomach pain and nausea for 2 weeks

## 2021-04-02 NOTE — END OF VISIT
[FreeTextEntry3] : "I, Clive Guerra, personally scribed the services dictated to me by Dr. Brendon Rouse MD in this documentation on 04/02/2021 "\par \par "I Dr. Brendon Rouse MD, personally performed the services described in this documentation on 04/02/2021 for the patient as scribed by Clive Guerra in my presence. I have reviewed and verified that all the information is accurate and true."

## 2021-04-02 NOTE — HEALTH RISK ASSESSMENT
[No] : In the past 12 months have you used drugs other than those required for medical reasons? No [No falls in past year] : Patient reported no falls in the past year [0] : 2) Feeling down, depressed, or hopeless: Not at all (0) [] : No [[id number]] : 0

## 2021-04-02 NOTE — PHYSICAL EXAM
[No Acute Distress] : no acute distress [Well Nourished] : well nourished [Well-Appearing] : well-appearing [Well Developed] : well developed [Normal Voice/Communication] : normal voice/communication [Normal Sclera/Conjunctiva] : normal sclera/conjunctiva [PERRL] : pupils equal round and reactive to light [EOMI] : extraocular movements intact [Normal Outer Ear/Nose] : the outer ears and nose were normal in appearance [No JVD] : no jugular venous distention [No Lymphadenopathy] : no lymphadenopathy [Supple] : supple [Thyroid Normal, No Nodules] : the thyroid was normal and there were no nodules present [No Respiratory Distress] : no respiratory distress  [No Accessory Muscle Use] : no accessory muscle use [Clear to Auscultation] : lungs were clear to auscultation bilaterally [Normal Rate] : normal rate  [Regular Rhythm] : with a regular rhythm [Normal S1, S2] : normal S1 and S2 [No Murmur] : no murmur heard [No Carotid Bruits] : no carotid bruits [No Abdominal Bruit] : a ~M bruit was not heard ~T in the abdomen [No Varicosities] : no varicosities [Pedal Pulses Present] : the pedal pulses are present [No Edema] : there was no peripheral edema [No Palpable Aorta] : no palpable aorta [No Extremity Clubbing/Cyanosis] : no extremity clubbing/cyanosis [Soft] : abdomen soft [Non Tender] : non-tender [Non-distended] : non-distended [No Masses] : no abdominal mass palpated [No HSM] : no HSM [Normal Bowel Sounds] : normal bowel sounds [Normal Supraclavicular Nodes] : no supraclavicular lymphadenopathy [Normal Posterior Cervical Nodes] : no posterior cervical lymphadenopathy [Normal Anterior Cervical Nodes] : no anterior cervical lymphadenopathy [No CVA Tenderness] : no CVA  tenderness [No Spinal Tenderness] : no spinal tenderness [No Joint Swelling] : no joint swelling [Grossly Normal Strength/Tone] : grossly normal strength/tone [No Rash] : no rash [Coordination Grossly Intact] : coordination grossly intact [No Focal Deficits] : no focal deficits [Normal Gait] : normal gait [Deep Tendon Reflexes (DTR)] : deep tendon reflexes were 2+ and symmetric [Speech Grossly Normal] : speech grossly normal [Memory Grossly Normal] : memory grossly normal [Normal Affect] : the affect was normal [Alert and Oriented x3] : oriented to person, place, and time [Normal Mood] : the mood was normal [Normal Insight/Judgement] : insight and judgment were intact

## 2021-04-02 NOTE — PLAN
[FreeTextEntry1] : Will restart Losartan at 50 MG twice a day\par Obtain CT of abdomen and pelvis\par Needs Mammogram\par

## 2021-04-06 ENCOUNTER — RESULT REVIEW (OUTPATIENT)
Age: 71
End: 2021-04-06

## 2021-04-06 ENCOUNTER — APPOINTMENT (OUTPATIENT)
Dept: CT IMAGING | Facility: CLINIC | Age: 71
End: 2021-04-06
Payer: MEDICARE

## 2021-04-06 ENCOUNTER — APPOINTMENT (OUTPATIENT)
Dept: ULTRASOUND IMAGING | Facility: CLINIC | Age: 71
End: 2021-04-06
Payer: MEDICARE

## 2021-04-06 ENCOUNTER — APPOINTMENT (OUTPATIENT)
Dept: MAMMOGRAPHY | Facility: CLINIC | Age: 71
End: 2021-04-06
Payer: MEDICARE

## 2021-04-06 ENCOUNTER — OUTPATIENT (OUTPATIENT)
Dept: OUTPATIENT SERVICES | Facility: HOSPITAL | Age: 71
LOS: 1 days | End: 2021-04-06
Payer: MEDICARE

## 2021-04-06 DIAGNOSIS — Z96.643 PRESENCE OF ARTIFICIAL HIP JOINT, BILATERAL: Chronic | ICD-10-CM

## 2021-04-06 DIAGNOSIS — I10 ESSENTIAL (PRIMARY) HYPERTENSION: ICD-10-CM

## 2021-04-06 DIAGNOSIS — R10.9 UNSPECIFIED ABDOMINAL PAIN: ICD-10-CM

## 2021-04-06 DIAGNOSIS — R92.2 INCONCLUSIVE MAMMOGRAM: ICD-10-CM

## 2021-04-06 PROCEDURE — 77067 SCR MAMMO BI INCL CAD: CPT | Mod: 26

## 2021-04-06 PROCEDURE — 76641 ULTRASOUND BREAST COMPLETE: CPT

## 2021-04-06 PROCEDURE — 77063 BREAST TOMOSYNTHESIS BI: CPT | Mod: 26

## 2021-04-06 PROCEDURE — 74176 CT ABD & PELVIS W/O CONTRAST: CPT | Mod: 26,MH

## 2021-04-06 PROCEDURE — 76641 ULTRASOUND BREAST COMPLETE: CPT | Mod: 26,50

## 2021-04-06 PROCEDURE — 74176 CT ABD & PELVIS W/O CONTRAST: CPT

## 2021-04-06 PROCEDURE — 77067 SCR MAMMO BI INCL CAD: CPT

## 2021-04-06 PROCEDURE — G1004: CPT

## 2021-04-06 PROCEDURE — 77063 BREAST TOMOSYNTHESIS BI: CPT

## 2021-05-12 LAB
ALBUMIN SERPL ELPH-MCNC: 4.6 G/DL
ALP BLD-CCNC: 77 U/L
ALT SERPL-CCNC: 17 U/L
ANION GAP SERPL CALC-SCNC: 10 MMOL/L
AST SERPL-CCNC: 26 U/L
BASOPHILS # BLD AUTO: 0.1 K/UL
BASOPHILS NFR BLD AUTO: 1.6 %
BILIRUB SERPL-MCNC: 0.3 MG/DL
BUN SERPL-MCNC: 19 MG/DL
CALCIUM SERPL-MCNC: 9.8 MG/DL
CHLORIDE SERPL-SCNC: 97 MMOL/L
CO2 SERPL-SCNC: 26 MMOL/L
CREAT SERPL-MCNC: 1.15 MG/DL
EOSINOPHIL # BLD AUTO: 0.15 K/UL
EOSINOPHIL NFR BLD AUTO: 2.3 %
GLUCOSE SERPL-MCNC: 87 MG/DL
HCT VFR BLD CALC: 35.3 %
HGB BLD-MCNC: 11.7 G/DL
IMM GRANULOCYTES NFR BLD AUTO: 0.2 %
LYMPHOCYTES # BLD AUTO: 1.95 K/UL
LYMPHOCYTES NFR BLD AUTO: 30.2 %
MAN DIFF?: NORMAL
MCHC RBC-ENTMCNC: 32.1 PG
MCHC RBC-ENTMCNC: 33.1 GM/DL
MCV RBC AUTO: 96.7 FL
MONOCYTES # BLD AUTO: 0.63 K/UL
MONOCYTES NFR BLD AUTO: 9.8 %
NEUTROPHILS # BLD AUTO: 3.61 K/UL
NEUTROPHILS NFR BLD AUTO: 55.9 %
PLATELET # BLD AUTO: 181 K/UL
POTASSIUM SERPL-SCNC: 5.3 MMOL/L
PROT SERPL-MCNC: 7 G/DL
RBC # BLD: 3.65 M/UL
RBC # FLD: 11.5 %
SODIUM SERPL-SCNC: 134 MMOL/L
WBC # FLD AUTO: 6.45 K/UL

## 2021-06-04 ENCOUNTER — RX RENEWAL (OUTPATIENT)
Age: 71
End: 2021-06-04

## 2021-06-16 ENCOUNTER — APPOINTMENT (OUTPATIENT)
Dept: INTERNAL MEDICINE | Facility: CLINIC | Age: 71
End: 2021-06-16
Payer: MEDICARE

## 2021-06-16 VITALS
DIASTOLIC BLOOD PRESSURE: 88 MMHG | WEIGHT: 118 LBS | BODY MASS INDEX: 19.66 KG/M2 | OXYGEN SATURATION: 97 % | HEART RATE: 76 BPM | SYSTOLIC BLOOD PRESSURE: 142 MMHG | RESPIRATION RATE: 14 BRPM | HEIGHT: 65 IN | TEMPERATURE: 97.6 F

## 2021-06-16 VITALS — DIASTOLIC BLOOD PRESSURE: 80 MMHG | SYSTOLIC BLOOD PRESSURE: 130 MMHG
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
## 2021-06-16 DIAGNOSIS — T07.XXXA UNSPECIFIED MULTIPLE INJURIES, INITIAL ENCOUNTER: ICD-10-CM
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
## 2021-06-16 PROCEDURE — 99214 OFFICE O/P EST MOD 30 MIN: CPT

## 2021-06-16 NOTE — HEALTH RISK ASSESSMENT
[No] : In the past 12 months have you used drugs other than those required for medical reasons? No [Any fall with injury in past year] : Patient reported fall with injury in the past year [0] : 2) Feeling down, depressed, or hopeless: Not at all (0) [] : No [[id number]] : 0

## 2021-06-16 NOTE — HISTORY OF PRESENT ILLNESS
[FreeTextEntry1] : Follow up [de-identified] : LAINE MOHAN is a 70 year old F who presents today for after fall 1 week ago injured right leg and head x-rays negative\par has pain right side has HTN

## 2021-06-16 NOTE — PHYSICAL EXAM
[No Acute Distress] : no acute distress [Well Nourished] : well nourished [Well Developed] : well developed [Well-Appearing] : well-appearing [Normal Voice/Communication] : normal voice/communication [Normal Sclera/Conjunctiva] : normal sclera/conjunctiva [PERRL] : pupils equal round and reactive to light [EOMI] : extraocular movements intact [Normal Outer Ear/Nose] : the outer ears and nose were normal in appearance [Normal Oropharynx] : the oropharynx was normal [No JVD] : no jugular venous distention [No Lymphadenopathy] : no lymphadenopathy [Supple] : supple [Thyroid Normal, No Nodules] : the thyroid was normal and there were no nodules present [No Respiratory Distress] : no respiratory distress  [No Accessory Muscle Use] : no accessory muscle use [Clear to Auscultation] : lungs were clear to auscultation bilaterally [Normal Rate] : normal rate  [Regular Rhythm] : with a regular rhythm [Normal S1, S2] : normal S1 and S2 [No Murmur] : no murmur heard [No Carotid Bruits] : no carotid bruits [No Abdominal Bruit] : a ~M bruit was not heard ~T in the abdomen [No Varicosities] : no varicosities [Pedal Pulses Present] : the pedal pulses are present [No Edema] : there was no peripheral edema [No Palpable Aorta] : no palpable aorta [No Extremity Clubbing/Cyanosis] : no extremity clubbing/cyanosis [Soft] : abdomen soft [Non Tender] : non-tender [Non-distended] : non-distended [No Masses] : no abdominal mass palpated [No HSM] : no HSM [Normal Bowel Sounds] : normal bowel sounds [Normal Supraclavicular Nodes] : no supraclavicular lymphadenopathy [Normal Posterior Cervical Nodes] : no posterior cervical lymphadenopathy [Normal Anterior Cervical Nodes] : no anterior cervical lymphadenopathy [No CVA Tenderness] : no CVA  tenderness [No Spinal Tenderness] : no spinal tenderness [No Joint Swelling] : no joint swelling [Grossly Normal Strength/Tone] : grossly normal strength/tone [No Rash] : no rash [Coordination Grossly Intact] : coordination grossly intact [No Focal Deficits] : no focal deficits [Normal Gait] : normal gait [Deep Tendon Reflexes (DTR)] : deep tendon reflexes were 2+ and symmetric [Speech Grossly Normal] : speech grossly normal [Memory Grossly Normal] : memory grossly normal [Normal Affect] : the affect was normal [Alert and Oriented x3] : oriented to person, place, and time [Normal Mood] : the mood was normal [Normal Insight/Judgement] : insight and judgment were intact [de-identified] : tenderness right leg

## 2021-06-23 ENCOUNTER — NON-APPOINTMENT (OUTPATIENT)
Age: 71
End: 2021-06-23

## 2021-06-23 ENCOUNTER — APPOINTMENT (OUTPATIENT)
Dept: CARDIOLOGY | Facility: CLINIC | Age: 71
End: 2021-06-23
Payer: MEDICARE

## 2021-06-23 VITALS
WEIGHT: 118 LBS | BODY MASS INDEX: 19.66 KG/M2 | DIASTOLIC BLOOD PRESSURE: 93 MMHG | OXYGEN SATURATION: 99 % | HEIGHT: 65 IN | SYSTOLIC BLOOD PRESSURE: 156 MMHG | HEART RATE: 73 BPM

## 2021-06-23 DIAGNOSIS — R03.0 ELEVATED BLOOD-PRESSURE READING, W/OUT DIAGNOSIS OF HYPERTENSION: ICD-10-CM

## 2021-06-23 PROCEDURE — 99214 OFFICE O/P EST MOD 30 MIN: CPT

## 2021-06-23 PROCEDURE — 93000 ELECTROCARDIOGRAM COMPLETE: CPT

## 2021-06-23 NOTE — HISTORY OF PRESENT ILLNESS
[FreeTextEntry1] : I saw Vikki Tellez in the office today for a cardiac follow up. 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 She is status post hip replacement surgery and doing well.\par \par She has a history of hypertension on losartan.  She is limited in exercise because of back and hip problems. She walks regularly and denies any exertional symptoms. She's been under a lot of stress recently. She is taking care of her ex- who is on hospice. She notes symptoms of shortness of breath and palpitation. These symptoms come and go randomly. Palpitations feel like a rapid heartbeat. She has no chest discomfort.\par \par She had a stress test 6/20 that showed no ischemia to a workload of 8 Mets. Most recent blood work performed 10/19 demonstrated a Cholesterol 2:15, triglycerides 77,HDL 80, and . A1c was 5.8. Echocardiogram 4/19 demonstrated an ejection fraction of 70%. There was mild MR and TR, with a PA pressure of 37. CT scan of the chest 9/19 was normal.  Carotid Doppler 6/20 showed minimal plaque.\par \par A resting 12-lead electrocardiogram demonstrates sinus rhythm and is unchanged.\par \par  She has no family history of heart disease and does not smoke.\par

## 2021-06-23 NOTE — PHYSICAL EXAM
[General Appearance - Well Developed] : well developed [Normal Appearance] : normal appearance [Well Groomed] : well groomed [General Appearance - Well Nourished] : well nourished [No Deformities] : no deformities [General Appearance - In No Acute Distress] : no acute distress [Normal Conjunctiva] : the conjunctiva exhibited no abnormalities [Normal Oral Mucosa] : normal oral mucosa [Normal Jugular Venous A Waves Present] : normal jugular venous A waves present [Normal Jugular Venous V Waves Present] : normal jugular venous V waves present [No Jugular Venous Mccollum A Waves] : no jugular venous mccollum A waves [Respiration, Rhythm And Depth] : normal respiratory rhythm and effort [Exaggerated Use Of Accessory Muscles For Inspiration] : no accessory muscle use [Bowel Sounds] : normal bowel sounds [Auscultation Breath Sounds / Voice Sounds] : lungs were clear to auscultation bilaterally [Abdomen Soft] : soft [Abdomen Tenderness] : non-tender [Abnormal Walk] : normal gait [Gait - Sufficient For Exercise Testing] : the gait was sufficient for exercise testing [Nail Clubbing] : no clubbing of the fingernails [Cyanosis, Localized] : no localized cyanosis [Skin Color & Pigmentation] : normal skin color and pigmentation [Skin Turgor] : normal skin turgor [] : no rash [Oriented To Time, Place, And Person] : oriented to person, place, and time [No Anxiety] : not feeling anxious [Impaired Insight] : insight and judgment were intact [Normal Rate] : normal [Normal S1] : normal S1 [Normal S2] : normal S2 [No Murmur] : no murmurs heard [2+] : left 2+ [1+] : left 1+ [No Abnormalities] : the abdominal aorta was not enlarged and no bruit was heard [No Pitting Edema] : no pitting edema present [S3] : no S3 [S4] : no S4 [Right Carotid Bruit] : no bruit heard over the right carotid [Left Carotid Bruit] : no bruit heard over the left carotid [Right Femoral Bruit] : no bruit heard over the right femoral artery [Left Femoral Bruit] : no bruit heard over the left femoral artery

## 2021-06-23 NOTE — DISCUSSION/SUMMARY
[FreeTextEntry1] : Patient has no specific cardiac symptoms.  She tries to be active as she can but is limited by back pain and hip pain.  She has no chest pain, shortness of breath, palpitation.  She recently fell in the bathroom and hit her head.  She is recovering.  Today she seems to be under stress.  Blood pressure is high.  Was normal in your office on 6/16.  She will start monitoring her pressure at home and let me know what she gets.  If her blood pressure is in fact high will increase her medication.\par \par She needs to watch her intake of sugar.  A1c is still 5.8.  Otherwise she is doing well.  We went over her medications, blood work, and I answered her questions.  If all is well I will see her in 1 year.

## 2021-06-23 NOTE — REVIEW OF SYSTEMS
[Feeling Fatigued] : feeling fatigued [Joint Pain] : joint pain [Abdominal Pain] : abdominal pain [Hand Pain] : hand pain [Foot Pain] : foot pain [Negative] : Genitourinary [Chest Discomfort] : no chest discomfort [Rash] : no rash [Dizziness] : no dizziness [Depression] : no depression [Anxiety] : no anxiety [Easy Bleeding] : no tendency for easy bleeding [Easy Bruising] : no tendency for easy bruising

## 2021-07-26 ENCOUNTER — APPOINTMENT (OUTPATIENT)
Dept: INTERNAL MEDICINE | Facility: CLINIC | Age: 71
End: 2021-07-26
Payer: MEDICARE

## 2021-07-26 VITALS
OXYGEN SATURATION: 99 % | WEIGHT: 117 LBS | RESPIRATION RATE: 14 BRPM | SYSTOLIC BLOOD PRESSURE: 122 MMHG | BODY MASS INDEX: 19.49 KG/M2 | TEMPERATURE: 98.3 F | DIASTOLIC BLOOD PRESSURE: 74 MMHG | HEIGHT: 65 IN | HEART RATE: 86 BPM

## 2021-07-26 PROCEDURE — 99214 OFFICE O/P EST MOD 30 MIN: CPT

## 2021-07-26 NOTE — HEALTH RISK ASSESSMENT
[No] : In the past 12 months have you used drugs other than those required for medical reasons? No [No falls in past year] : Patient reported no falls in the past year [0] : 2) Feeling down, depressed, or hopeless: Not at all (0) [PHQ-2 Negative - No further assessment needed] : PHQ-2 Negative - No further assessment needed [] : No [[id number]] : 0

## 2021-07-26 NOTE — HISTORY OF PRESENT ILLNESS
[Cold Symptoms] : cold symptoms [Moderate] : moderate [___ Days ago] : [unfilled] days ago [Congestion] : congestion [Cough] : cough [Sore Throat] : sore throat [Chills] : chills [Fatigue] : fatigue [Fever] : fever [Stable] : stable [Wheezing] : no wheezing [Anorexia] : no anorexia [Shortness Of Breath] : no shortness of breath [Earache] : no earache [FreeTextEntry8] : ill for 4 days\par here to recheck BP

## 2021-09-01 ENCOUNTER — RX RENEWAL (OUTPATIENT)
Age: 71
End: 2021-09-01

## 2021-09-24 ENCOUNTER — APPOINTMENT (OUTPATIENT)
Dept: INTERNAL MEDICINE | Facility: CLINIC | Age: 71
End: 2021-09-24
Payer: MEDICARE

## 2021-09-24 VITALS
OXYGEN SATURATION: 98 % | WEIGHT: 117 LBS | RESPIRATION RATE: 14 BRPM | DIASTOLIC BLOOD PRESSURE: 80 MMHG | SYSTOLIC BLOOD PRESSURE: 140 MMHG | HEIGHT: 65 IN | HEART RATE: 85 BPM | BODY MASS INDEX: 19.49 KG/M2

## 2021-09-24 VITALS — DIASTOLIC BLOOD PRESSURE: 80 MMHG | SYSTOLIC BLOOD PRESSURE: 134 MMHG

## 2021-09-24 DIAGNOSIS — J30.9 ALLERGIC RHINITIS, UNSPECIFIED: ICD-10-CM

## 2021-09-24 PROCEDURE — 99214 OFFICE O/P EST MOD 30 MIN: CPT

## 2021-09-24 NOTE — END OF VISIT
[FreeTextEntry3] : "I, Laurie Calvillo, personally scribed the services dictated to me by Dr. Brendon Rouse MD in this documentation on 09/24/2021 "\par \par "I Dr. Brendon Rouse MD, personally performed the services described in this documentation on 09/24/2021 for the patient as scribed by Laurie Calvillo in my presence. I have reviewed and verified that all the information is accurate and true."\par \par

## 2021-09-24 NOTE — HISTORY OF PRESENT ILLNESS
[Moderate] : moderate [___ Weeks ago] :  [unfilled] weeks ago [Episodic] : episodic  [Congestion] : congestion [Cough] : cough [Headache] : headache [Stable] : stable [Sore Throat] : no sore throat [Wheezing] : no wheezing [Chills] : no chills [Anorexia] : no anorexia [Earache] : no earache [Shortness Of Breath] : no shortness of breath [Fatigue] : not fatigue [Fever] : no fever [FreeTextEntry8] : LAINE MOHAN is a 71 year old F who presents today for an acute visit. Pt complains of congestion and cough for 3 weeks. Pt is also here for follow up for BP.

## 2021-09-24 NOTE — HEALTH RISK ASSESSMENT
[No] : In the past 12 months have you used drugs other than those required for medical reasons? No [No falls in past year] : Patient reported no falls in the past year [0] : 2) Feeling down, depressed, or hopeless: Not at all (0) [PHQ-2 Negative - No further assessment needed] : PHQ-2 Negative - No further assessment needed [] : No [[id number]] : 0

## 2021-11-08 ENCOUNTER — APPOINTMENT (OUTPATIENT)
Dept: INTERNAL MEDICINE | Facility: CLINIC | Age: 71
End: 2021-11-08
Payer: MEDICARE

## 2021-11-08 VITALS
OXYGEN SATURATION: 98 % | HEIGHT: 65 IN | BODY MASS INDEX: 19.49 KG/M2 | DIASTOLIC BLOOD PRESSURE: 70 MMHG | WEIGHT: 117 LBS | RESPIRATION RATE: 14 BRPM | TEMPERATURE: 97.6 F | SYSTOLIC BLOOD PRESSURE: 130 MMHG | HEART RATE: 84 BPM

## 2021-11-08 VITALS — DIASTOLIC BLOOD PRESSURE: 80 MMHG | SYSTOLIC BLOOD PRESSURE: 132 MMHG

## 2021-11-08 DIAGNOSIS — M51.26 OTHER INTERVERTEBRAL DISC DISPLACEMENT, LUMBAR REGION: ICD-10-CM

## 2021-11-08 PROCEDURE — G0008: CPT

## 2021-11-08 PROCEDURE — 90662 IIV NO PRSV INCREASED AG IM: CPT

## 2021-11-08 PROCEDURE — 99214 OFFICE O/P EST MOD 30 MIN: CPT | Mod: 25

## 2021-11-08 NOTE — HEALTH RISK ASSESSMENT
[No] : In the past 12 months have you used drugs other than those required for medical reasons? No [No falls in past year] : Patient reported no falls in the past year [0] : 2) Feeling down, depressed, or hopeless: Not at all (0) [] : No [[id number]] : 0

## 2021-11-08 NOTE — HISTORY OF PRESENT ILLNESS
[FreeTextEntry8] : LAINE MOHAN is a 71 year old F who presents today for lower back pain radiates down both legs on Medrol and Flexeril from\par urgent care 3 days ago \par feeling better\par has HTN

## 2021-11-08 NOTE — END OF VISIT
[FreeTextEntry3] : "I, Laurie Calvillo, personally scribed the services dictated to me by Dr. Brendon Rouse MD in this documentation on 11/08/2021 "\par \par "I Dr. Brendon Rouse MD, personally performed the services described in this documentation on 11/08/2021 for the patient as scribed by Laurie Calvillo in my presence. I have reviewed and verified that all the information is accurate and true."\par \par

## 2021-11-24 ENCOUNTER — NON-APPOINTMENT (OUTPATIENT)
Age: 71
End: 2021-11-24

## 2021-11-24 ENCOUNTER — APPOINTMENT (OUTPATIENT)
Dept: CARDIOLOGY | Facility: CLINIC | Age: 71
End: 2021-11-24
Payer: MEDICARE

## 2021-11-24 VITALS
WEIGHT: 117 LBS | SYSTOLIC BLOOD PRESSURE: 175 MMHG | HEART RATE: 71 BPM | BODY MASS INDEX: 19.49 KG/M2 | OXYGEN SATURATION: 99 % | DIASTOLIC BLOOD PRESSURE: 84 MMHG | HEIGHT: 65 IN

## 2021-11-24 DIAGNOSIS — R73.03 PREDIABETES.: ICD-10-CM

## 2021-11-24 DIAGNOSIS — R73.09 OTHER ABNORMAL GLUCOSE: ICD-10-CM

## 2021-11-24 DIAGNOSIS — R55 SYNCOPE AND COLLAPSE: ICD-10-CM

## 2021-11-24 PROCEDURE — 99214 OFFICE O/P EST MOD 30 MIN: CPT

## 2021-11-24 PROCEDURE — 93000 ELECTROCARDIOGRAM COMPLETE: CPT

## 2021-11-24 NOTE — REVIEW OF SYSTEMS
[Chest Discomfort] : no chest discomfort [Rash] : no rash [Dizziness] : no dizziness [Depression] : no depression [Anxiety] : no anxiety [Easy Bleeding] : no tendency for easy bleeding [Easy Bruising] : no tendency for easy bruising

## 2021-11-24 NOTE — DISCUSSION/SUMMARY
[FreeTextEntry1] : Within the past 6 months patient has had 3 episodes of syncope.  They sound vagal in origin.  They are associated with a prodrome consisting of hot, nauseous, and sweaty, followed by feeling of lightheadedness and then syncope.  Each time she tried to go someplace to sit down.\par \par If she gets another event she needs to try to get her head down right where she is to negate the effects of gravity.  She will lay down or put her head between her legs.  This should prevent her from passing out.  She needs a practice good hydration.  If despite hydration she continues to have episodes shhe may have to cut back on her losartan.  In the past 2 years she had a normal stress test, echo, and carotid Doppler. I do not think this is a primary cardiac event.\par \par This was discussed with the patient in detail and I answered her questions.  If she has further episodes she will let me know.  Hopefully she can control things with change in lifestyle.\par \par If all is well I would see her in 3 months.  I will try to get records from [facility]

## 2021-11-24 NOTE — HISTORY OF PRESENT ILLNESS
[FreeTextEntry1] : I saw Vikki Tellez in the office today for a cardiac follow up. 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 She is status post hip replacement surgery and doing well.\par \par She has a history of hypertension on losartan.  She is limited in exercise because of back and hip problems. She walks regularly and denies any exertional symptoms. She's been under a lot of stress recently. She is taking care of her ex- who is on hospice. She notes symptoms of shortness of breath and palpitation. These symptoms come and go randomly. Palpitations feel like a rapid heartbeat. She has no chest discomfort.\par \par She had a stress test 6/20 that showed no ischemia to a workload of 8 Mets. Most recent blood work performed 10/19 demonstrated a Cholesterol 215, triglycerides 77,HDL 80, and . A1c was 5.8.  Blood work 4/21 demonstrated normal CBC and CMP.  Echocardiogram 4/19 demonstrated an ejection fraction of 70%. There was mild MR and TR, with a PA pressure of 37. CT scan of the chest 9/19 was normal.  Carotid Doppler 6/20 showed minimal plaque.\par \par A resting 12-lead electrocardiogram demonstrates sinus rhythm and is unchanged.\par \par  She has no family history of heart disease and does not smoke.\par \par Patient was seen in the emergency room at [facility] 11/17/21 following an episode of syncope.. ECG showed sinus rhythm without change.  Work-up was negative.  In retrospect she had an episode of syncope in June of this year, September, and in November.  Each episode occurred when she was standing.  She felt sweaty, nauseous, hot and lightheaded.  She walked to try to get some place to sit down each time she passed out.  She woke up very quickly without any intervention.  She does have difficulty with hydration.\par

## 2021-12-02 ENCOUNTER — APPOINTMENT (OUTPATIENT)
Dept: INTERNAL MEDICINE | Facility: CLINIC | Age: 71
End: 2021-12-02
Payer: MEDICARE

## 2021-12-02 PROCEDURE — 0004A: CPT

## 2021-12-14 ENCOUNTER — APPOINTMENT (OUTPATIENT)
Dept: CARDIOLOGY | Facility: CLINIC | Age: 71
End: 2021-12-14

## 2022-02-28 ENCOUNTER — APPOINTMENT (OUTPATIENT)
Dept: INTERNAL MEDICINE | Facility: CLINIC | Age: 72
End: 2022-02-28
Payer: MEDICARE

## 2022-02-28 VITALS — SYSTOLIC BLOOD PRESSURE: 170 MMHG | DIASTOLIC BLOOD PRESSURE: 80 MMHG

## 2022-02-28 VITALS
DIASTOLIC BLOOD PRESSURE: 84 MMHG | SYSTOLIC BLOOD PRESSURE: 162 MMHG | TEMPERATURE: 97.5 F | HEIGHT: 65 IN | HEART RATE: 98 BPM | BODY MASS INDEX: 19.49 KG/M2 | WEIGHT: 117 LBS | OXYGEN SATURATION: 98 % | RESPIRATION RATE: 14 BRPM

## 2022-02-28 DIAGNOSIS — R10.9 UNSPECIFIED ABDOMINAL PAIN: ICD-10-CM

## 2022-02-28 DIAGNOSIS — R42 DIZZINESS AND GIDDINESS: ICD-10-CM

## 2022-02-28 PROCEDURE — 99214 OFFICE O/P EST MOD 30 MIN: CPT

## 2022-02-28 NOTE — HEALTH RISK ASSESSMENT
[Never] : Never [No] : In the past 12 months have you used drugs other than those required for medical reasons? No [No falls in past year] : Patient reported no falls in the past year [0] : 2) Feeling down, depressed, or hopeless: Not at all (0) [PHQ-2 Negative - No further assessment needed] : PHQ-2 Negative - No further assessment needed [[id number]] : 0

## 2022-02-28 NOTE — HISTORY OF PRESENT ILLNESS
[FreeTextEntry1] : follow up  [de-identified] : LAINE MOHAN is a 71 year old F who presents today for follow up for HTN. Pt complains of dizziness. has CKD

## 2022-02-28 NOTE — END OF VISIT
[FreeTextEntry3] : "I, Laurie Calvillo, personally scribed the services dictated to me by Dr. Brendon Rouse MD in this documentation on 02/28/2022 " \par \par "I Dr. Brendon Rouse MD, personally performed the services described in this documentation on 02/28/2022 for the patient as scribed by Laurie Calvillo in my presence. I have reviewed and verified that all the information is accurate and true."\par

## 2022-02-28 NOTE — PLAN
[FreeTextEntry1] : Start additional BP medication \par Continue other medications \par Sent for US of abdomen\par return 1 week

## 2022-03-02 ENCOUNTER — APPOINTMENT (OUTPATIENT)
Dept: ULTRASOUND IMAGING | Facility: CLINIC | Age: 72
End: 2022-03-02
Payer: MEDICARE

## 2022-03-02 ENCOUNTER — OUTPATIENT (OUTPATIENT)
Dept: OUTPATIENT SERVICES | Facility: HOSPITAL | Age: 72
LOS: 1 days | End: 2022-03-02
Payer: MEDICARE

## 2022-03-02 DIAGNOSIS — Z96.643 PRESENCE OF ARTIFICIAL HIP JOINT, BILATERAL: Chronic | ICD-10-CM

## 2022-03-02 DIAGNOSIS — R10.9 UNSPECIFIED ABDOMINAL PAIN: ICD-10-CM

## 2022-03-02 PROCEDURE — 76700 US EXAM ABDOM COMPLETE: CPT | Mod: 26

## 2022-03-02 PROCEDURE — 76700 US EXAM ABDOM COMPLETE: CPT

## 2022-03-07 ENCOUNTER — APPOINTMENT (OUTPATIENT)
Dept: INTERNAL MEDICINE | Facility: CLINIC | Age: 72
End: 2022-03-07
Payer: MEDICARE

## 2022-03-07 VITALS
OXYGEN SATURATION: 98 % | HEART RATE: 83 BPM | WEIGHT: 120 LBS | BODY MASS INDEX: 19.99 KG/M2 | HEIGHT: 65 IN | RESPIRATION RATE: 14 BRPM | DIASTOLIC BLOOD PRESSURE: 70 MMHG | SYSTOLIC BLOOD PRESSURE: 140 MMHG

## 2022-03-07 VITALS — SYSTOLIC BLOOD PRESSURE: 132 MMHG | DIASTOLIC BLOOD PRESSURE: 76 MMHG

## 2022-03-07 PROCEDURE — 99213 OFFICE O/P EST LOW 20 MIN: CPT

## 2022-03-07 NOTE — HEALTH RISK ASSESSMENT
[Never] : Never [No] : In the past 12 months have you used drugs other than those required for medical reasons? No [No falls in past year] : Patient reported no falls in the past year [0] : 2) Feeling down, depressed, or hopeless: Not at all (0) [PHQ-2 Negative - No further assessment needed] : PHQ-2 Negative - No further assessment needed [[id number]] : 0

## 2022-03-07 NOTE — HISTORY OF PRESENT ILLNESS
[FreeTextEntry1] : follow up  [de-identified] : LAINE MOHAN is a 71 year old F who presents today for follow up for BP. Patient has no new complaints\par

## 2022-03-07 NOTE — END OF VISIT
[FreeTextEntry3] : "I, Laurie Calvillo, personally scribed the services dictated to me by Dr. Brendon Rouse MD in this documentation on 03/07/2022 " \par \par "I Dr. Brendon Rouse MD, personally performed the services described in this documentation on 03/07/2022 for the patient as scribed by Laurie Calvillo in my presence. I have reviewed and verified that all the information is accurate and true."\par

## 2022-05-26 NOTE — ED PROVIDER NOTE - PROGRESS NOTE DETAILS
I started having a headache and seeing the "lightning" at 2:30
Feels well at this time and wants to go home

## 2022-07-01 ENCOUNTER — NON-APPOINTMENT (OUTPATIENT)
Age: 72
End: 2022-07-01

## 2022-07-01 ENCOUNTER — APPOINTMENT (OUTPATIENT)
Dept: INTERNAL MEDICINE | Facility: CLINIC | Age: 72
End: 2022-07-01

## 2022-07-01 VITALS
TEMPERATURE: 97.3 F | HEIGHT: 65 IN | RESPIRATION RATE: 16 BRPM | SYSTOLIC BLOOD PRESSURE: 146 MMHG | WEIGHT: 122 LBS | HEART RATE: 93 BPM | DIASTOLIC BLOOD PRESSURE: 82 MMHG | BODY MASS INDEX: 20.33 KG/M2 | OXYGEN SATURATION: 98 %

## 2022-07-01 DIAGNOSIS — M75.41 IMPINGEMENT SYNDROME OF RIGHT SHOULDER: ICD-10-CM

## 2022-07-01 DIAGNOSIS — Z87.898 PERSONAL HISTORY OF OTHER SPECIFIED CONDITIONS: ICD-10-CM

## 2022-07-01 PROCEDURE — 99214 OFFICE O/P EST MOD 30 MIN: CPT | Mod: 25

## 2022-07-01 PROCEDURE — 93000 ELECTROCARDIOGRAM COMPLETE: CPT

## 2022-07-01 NOTE — HEALTH RISK ASSESSMENT
[Former] : Former [No] : In the past 12 months have you used drugs other than those required for medical reasons? No [No falls in past year] : Patient reported no falls in the past year [0] : 2) Feeling down, depressed, or hopeless: Not at all (0) [PHQ-2 Negative - No further assessment needed] : PHQ-2 Negative - No further assessment needed [YearQuit] : 1972 [[id number]] : 0

## 2022-07-01 NOTE — HISTORY OF PRESENT ILLNESS
[FreeTextEntry1] : follow up [de-identified] : LAINE MOHAN is a 71 year old F who presents today for HTN stopped Norvasc \par has chest pains

## 2022-07-01 NOTE — PHYSICAL EXAM
[No Acute Distress] : no acute distress [Well Nourished] : well nourished [Well Developed] : well developed [Well-Appearing] : well-appearing [Normal Sclera/Conjunctiva] : normal sclera/conjunctiva [PERRL] : pupils equal round and reactive to light [EOMI] : extraocular movements intact [Normal Outer Ear/Nose] : the outer ears and nose were normal in appearance [Normal Oropharynx] : the oropharynx was normal [No JVD] : no jugular venous distention [No Lymphadenopathy] : no lymphadenopathy [Supple] : supple [Thyroid Normal, No Nodules] : the thyroid was normal and there were no nodules present [No Respiratory Distress] : no respiratory distress  [No Accessory Muscle Use] : no accessory muscle use [Clear to Auscultation] : lungs were clear to auscultation bilaterally [Normal Rate] : normal rate  [Regular Rhythm] : with a regular rhythm [Normal S1, S2] : normal S1 and S2 [No Murmur] : no murmur heard [No Carotid Bruits] : no carotid bruits [No Abdominal Bruit] : a ~M bruit was not heard ~T in the abdomen [No Varicosities] : no varicosities [Pedal Pulses Present] : the pedal pulses are present [No Edema] : there was no peripheral edema [No Palpable Aorta] : no palpable aorta [No Extremity Clubbing/Cyanosis] : no extremity clubbing/cyanosis [Soft] : abdomen soft [Non Tender] : non-tender [Non-distended] : non-distended [No Masses] : no abdominal mass palpated [No HSM] : no HSM [Normal Bowel Sounds] : normal bowel sounds [Normal Posterior Cervical Nodes] : no posterior cervical lymphadenopathy [Normal Anterior Cervical Nodes] : no anterior cervical lymphadenopathy [No CVA Tenderness] : no CVA  tenderness [No Spinal Tenderness] : no spinal tenderness [No Joint Swelling] : no joint swelling [Grossly Normal Strength/Tone] : grossly normal strength/tone [No Rash] : no rash [Coordination Grossly Intact] : coordination grossly intact [No Focal Deficits] : no focal deficits [Normal Gait] : normal gait [Deep Tendon Reflexes (DTR)] : deep tendon reflexes were 2+ and symmetric [Normal Affect] : the affect was normal [Normal Insight/Judgement] : insight and judgment were intact [de-identified] : right shoulder tenderness

## 2022-07-15 ENCOUNTER — APPOINTMENT (OUTPATIENT)
Dept: INTERNAL MEDICINE | Facility: CLINIC | Age: 72
End: 2022-07-15

## 2022-07-15 VITALS
SYSTOLIC BLOOD PRESSURE: 130 MMHG | TEMPERATURE: 98.3 F | HEART RATE: 66 BPM | RESPIRATION RATE: 14 BRPM | DIASTOLIC BLOOD PRESSURE: 72 MMHG | WEIGHT: 120 LBS | BODY MASS INDEX: 19.99 KG/M2 | OXYGEN SATURATION: 98 % | HEIGHT: 65 IN

## 2022-07-15 PROCEDURE — 99214 OFFICE O/P EST MOD 30 MIN: CPT

## 2022-07-15 NOTE — HEALTH RISK ASSESSMENT
[Never] : Never [Yes] : Yes [2 - 4 times a month (2 pts)] : 2-4 times a month (2 points) [1 or 2 (0 pts)] : 1 or 2 (0 points) [Never (0 pts)] : Never (0 points) [No] : In the past 12 months have you used drugs other than those required for medical reasons? No [No falls in past year] : Patient reported no falls in the past year [0] : 2) Feeling down, depressed, or hopeless: Not at all (0) [PHQ-2 Negative - No further assessment needed] : PHQ-2 Negative - No further assessment needed [[id number]] : 0

## 2022-07-15 NOTE — HISTORY OF PRESENT ILLNESS
[FreeTextEntry1] : follow up [de-identified] : LAINE MOHAN is a 71 year old F who presents today for HTN\par feels better

## 2022-09-06 ENCOUNTER — RX RENEWAL (OUTPATIENT)
Age: 72
End: 2022-09-06

## 2022-09-21 ENCOUNTER — RX RENEWAL (OUTPATIENT)
Age: 72
End: 2022-09-21

## 2022-11-04 ENCOUNTER — APPOINTMENT (OUTPATIENT)
Dept: INTERNAL MEDICINE | Facility: CLINIC | Age: 72
End: 2022-11-04

## 2022-11-04 VITALS
OXYGEN SATURATION: 98 % | SYSTOLIC BLOOD PRESSURE: 136 MMHG | HEIGHT: 65 IN | BODY MASS INDEX: 19.66 KG/M2 | TEMPERATURE: 97.7 F | HEART RATE: 72 BPM | RESPIRATION RATE: 14 BRPM | WEIGHT: 118 LBS | DIASTOLIC BLOOD PRESSURE: 88 MMHG

## 2022-11-04 VITALS — SYSTOLIC BLOOD PRESSURE: 130 MMHG | DIASTOLIC BLOOD PRESSURE: 80 MMHG

## 2022-11-04 DIAGNOSIS — Z23 ENCOUNTER FOR IMMUNIZATION: ICD-10-CM

## 2022-11-04 PROCEDURE — 99214 OFFICE O/P EST MOD 30 MIN: CPT | Mod: 25

## 2022-11-04 PROCEDURE — 90662 IIV NO PRSV INCREASED AG IM: CPT

## 2022-11-04 PROCEDURE — G0008: CPT

## 2022-11-04 NOTE — HISTORY OF PRESENT ILLNESS
[FreeTextEntry1] : follow up  [de-identified] : LAINE MOHAN is a 72 year old F who presents today for follow up for Blood Pressure

## 2022-11-04 NOTE — HEALTH RISK ASSESSMENT
[Never] : Never [Never (0 pts)] : Never (0 points) [No] : In the past 12 months have you used drugs other than those required for medical reasons? No [No falls in past year] : Patient reported no falls in the past year [0] : 2) Feeling down, depressed, or hopeless: Not at all (0) [PHQ-2 Negative - No further assessment needed] : PHQ-2 Negative - No further assessment needed [[id number]] : 0

## 2022-11-04 NOTE — END OF VISIT
[FreeTextEntry3] : "I, Kendrick Riddle, personally scribed the services dictated to me by Dr. Brendon Rouse MD in this documentation on 11/04/2022 " \par \par "I Dr. Brendon Rouse MD, personally performed the services described in this documentation on 11/04/2022 for the patient as scribed by Kendrick Riddle in my presence. I have reviewed and verified that all the information is accurate and true."

## 2022-12-12 ENCOUNTER — RX RENEWAL (OUTPATIENT)
Age: 72
End: 2022-12-12

## 2022-12-13 ENCOUNTER — APPOINTMENT (OUTPATIENT)
Dept: INTERNAL MEDICINE | Facility: CLINIC | Age: 72
End: 2022-12-13

## 2022-12-13 VITALS
SYSTOLIC BLOOD PRESSURE: 152 MMHG | BODY MASS INDEX: 20.16 KG/M2 | OXYGEN SATURATION: 98 % | WEIGHT: 121 LBS | HEART RATE: 75 BPM | TEMPERATURE: 97.1 F | DIASTOLIC BLOOD PRESSURE: 84 MMHG | RESPIRATION RATE: 14 BRPM | HEIGHT: 65 IN

## 2022-12-13 VITALS — DIASTOLIC BLOOD PRESSURE: 80 MMHG | SYSTOLIC BLOOD PRESSURE: 140 MMHG

## 2022-12-13 DIAGNOSIS — M25.511 PAIN IN RIGHT SHOULDER: ICD-10-CM

## 2022-12-13 PROCEDURE — 99214 OFFICE O/P EST MOD 30 MIN: CPT | Mod: 25

## 2022-12-13 PROCEDURE — 90677 PCV20 VACCINE IM: CPT

## 2022-12-13 PROCEDURE — G0009: CPT

## 2022-12-13 NOTE — END OF VISIT
[FreeTextEntry3] : "I, Kendrick Riddle, personally scribed the services dictated to me by Dr. Brendon Rouse MD in this documentation on 12/13/2022 " \par \par "I Dr. Brendon Rouse MD, personally performed the services described in this documentation on 12/13/2022 for the patient as scribed by Kendrick Riddle in my presence. I have reviewed and verified that all the information is accurate and true."

## 2022-12-13 NOTE — HISTORY OF PRESENT ILLNESS
[FreeTextEntry8] : LAINE MOHAN is a 72 year old F who presents today for an acute visit for sinus congestion and back pain and bone spur on right shoulder.

## 2022-12-13 NOTE — PHYSICAL EXAM
[No Acute Distress] : no acute distress [Well Nourished] : well nourished [Well Developed] : well developed [Well-Appearing] : well-appearing [Normal Voice/Communication] : normal voice/communication [Normal Sclera/Conjunctiva] : normal sclera/conjunctiva [PERRL] : pupils equal round and reactive to light [EOMI] : extraocular movements intact [Normal Outer Ear/Nose] : the outer ears and nose were normal in appearance [Normal Oropharynx] : the oropharynx was normal [No JVD] : no jugular venous distention [No Lymphadenopathy] : no lymphadenopathy [Supple] : supple [Thyroid Normal, No Nodules] : the thyroid was normal and there were no nodules present [No Respiratory Distress] : no respiratory distress  [No Accessory Muscle Use] : no accessory muscle use [Clear to Auscultation] : lungs were clear to auscultation bilaterally [Normal Rate] : normal rate  [Regular Rhythm] : with a regular rhythm [Normal S1, S2] : normal S1 and S2 [No Murmur] : no murmur heard [No Carotid Bruits] : no carotid bruits [No Abdominal Bruit] : a ~M bruit was not heard ~T in the abdomen [No Varicosities] : no varicosities [Pedal Pulses Present] : the pedal pulses are present [No Edema] : there was no peripheral edema [No Palpable Aorta] : no palpable aorta [No Extremity Clubbing/Cyanosis] : no extremity clubbing/cyanosis [Soft] : abdomen soft [Non Tender] : non-tender [Non-distended] : non-distended [No Masses] : no abdominal mass palpated [No HSM] : no HSM [Normal Bowel Sounds] : normal bowel sounds [Normal Supraclavicular Nodes] : no supraclavicular lymphadenopathy [Normal Posterior Cervical Nodes] : no posterior cervical lymphadenopathy [Normal Anterior Cervical Nodes] : no anterior cervical lymphadenopathy [No CVA Tenderness] : no CVA  tenderness [No Spinal Tenderness] : no spinal tenderness [No Joint Swelling] : no joint swelling [Grossly Normal Strength/Tone] : grossly normal strength/tone [No Rash] : no rash [Coordination Grossly Intact] : coordination grossly intact [No Focal Deficits] : no focal deficits [Normal Gait] : normal gait [Deep Tendon Reflexes (DTR)] : deep tendon reflexes were 2+ and symmetric [Speech Grossly Normal] : speech grossly normal [Memory Grossly Normal] : memory grossly normal [Normal Affect] : the affect was normal [Alert and Oriented x3] : oriented to person, place, and time [Normal Mood] : the mood was normal [Normal Insight/Judgement] : insight and judgment were intact [de-identified] : spur on right shoulder

## 2022-12-13 NOTE — HEALTH RISK ASSESSMENT
[Never] : Never [Never (0 pts)] : Never (0 points) [No] : In the past 12 months have you used drugs other than those required for medical reasons? No [No falls in past year] : Patient reported no falls in the past year [0] : 2) Feeling down, depressed, or hopeless: Not at all (0) [PHQ-2 Negative - No further assessment needed] : PHQ-2 Negative - No further assessment needed [[id number]] : 0

## 2022-12-23 ENCOUNTER — APPOINTMENT (OUTPATIENT)
Dept: MAMMOGRAPHY | Facility: CLINIC | Age: 72
End: 2022-12-23

## 2022-12-23 ENCOUNTER — APPOINTMENT (OUTPATIENT)
Dept: ULTRASOUND IMAGING | Facility: CLINIC | Age: 72
End: 2022-12-23

## 2022-12-27 ENCOUNTER — APPOINTMENT (OUTPATIENT)
Dept: INTERNAL MEDICINE | Facility: CLINIC | Age: 72
End: 2022-12-27

## 2022-12-27 VITALS
WEIGHT: 120 LBS | HEIGHT: 65 IN | OXYGEN SATURATION: 99 % | DIASTOLIC BLOOD PRESSURE: 80 MMHG | BODY MASS INDEX: 19.99 KG/M2 | HEART RATE: 85 BPM | SYSTOLIC BLOOD PRESSURE: 140 MMHG | RESPIRATION RATE: 14 BRPM

## 2022-12-27 DIAGNOSIS — J01.90 ACUTE SINUSITIS, UNSPECIFIED: ICD-10-CM

## 2022-12-27 PROCEDURE — 99214 OFFICE O/P EST MOD 30 MIN: CPT

## 2022-12-27 NOTE — PHYSICAL EXAM
[No Acute Distress] : no acute distress [Well Nourished] : well nourished [Well Developed] : well developed [Well-Appearing] : well-appearing [Normal Voice/Communication] : normal voice/communication [Normal Sclera/Conjunctiva] : normal sclera/conjunctiva [PERRL] : pupils equal round and reactive to light [EOMI] : extraocular movements intact [Normal Outer Ear/Nose] : the outer ears and nose were normal in appearance [Normal Oropharynx] : the oropharynx was normal [Normal TMs] : both tympanic membranes were normal [No JVD] : no jugular venous distention [No Lymphadenopathy] : no lymphadenopathy [Supple] : supple [Thyroid Normal, No Nodules] : the thyroid was normal and there were no nodules present [No Respiratory Distress] : no respiratory distress  [No Accessory Muscle Use] : no accessory muscle use [Clear to Auscultation] : lungs were clear to auscultation bilaterally [Normal Rate] : normal rate  [Regular Rhythm] : with a regular rhythm [Normal S1, S2] : normal S1 and S2 [No Murmur] : no murmur heard [No Carotid Bruits] : no carotid bruits [No Abdominal Bruit] : a ~M bruit was not heard ~T in the abdomen [No Varicosities] : no varicosities [Pedal Pulses Present] : the pedal pulses are present [No Edema] : there was no peripheral edema [No Palpable Aorta] : no palpable aorta [No Extremity Clubbing/Cyanosis] : no extremity clubbing/cyanosis [Soft] : abdomen soft [Non Tender] : non-tender [Non-distended] : non-distended [No Masses] : no abdominal mass palpated [No HSM] : no HSM [Normal Bowel Sounds] : normal bowel sounds [Normal Posterior Cervical Nodes] : no posterior cervical lymphadenopathy [Normal Anterior Cervical Nodes] : no anterior cervical lymphadenopathy [No CVA Tenderness] : no CVA  tenderness [No Spinal Tenderness] : no spinal tenderness [No Joint Swelling] : no joint swelling [Grossly Normal Strength/Tone] : grossly normal strength/tone [No Rash] : no rash [Coordination Grossly Intact] : coordination grossly intact [No Focal Deficits] : no focal deficits [Normal Gait] : normal gait [Deep Tendon Reflexes (DTR)] : deep tendon reflexes were 2+ and symmetric [Speech Grossly Normal] : speech grossly normal [Memory Grossly Normal] : memory grossly normal [Normal Affect] : the affect was normal [Normal Mood] : the mood was normal [Normal Insight/Judgement] : insight and judgment were intact

## 2022-12-27 NOTE — HISTORY OF PRESENT ILLNESS
[Moderate] : moderate [___ Days ago] : [unfilled] days ago [Congestion] : congestion [Cough] : cough [Sore Throat] : sore throat [Headache] : headache [Stable] : stable [Wheezing] : no wheezing [Chills] : no chills [Anorexia] : no anorexia [Shortness Of Breath] : no shortness of breath [Earache] : no earache [Fatigue] : not fatigue [Fever] : no fever [FreeTextEntry8] : LAINE MOHAN is a 72 year old F who presents today for cough sinus pressure congestion\par went to Urgent care given Steroids

## 2022-12-27 NOTE — HEALTH RISK ASSESSMENT
[Never] : Never [No] : In the past 12 months have you used drugs other than those required for medical reasons? No [No falls in past year] : Patient reported no falls in the past year [0] : 2) Feeling down, depressed, or hopeless: Not at all (0) [PHQ-2 Positive] : PHQ-2 Positive [[id number]] : 0

## 2023-01-12 ENCOUNTER — OFFICE (OUTPATIENT)
Dept: URBAN - METROPOLITAN AREA CLINIC 12 | Facility: CLINIC | Age: 73
Setting detail: OPHTHALMOLOGY
End: 2023-01-12
Payer: MEDICARE

## 2023-01-12 DIAGNOSIS — H25.13: ICD-10-CM

## 2023-01-12 DIAGNOSIS — H25.89: ICD-10-CM

## 2023-01-12 PROCEDURE — 99204 OFFICE O/P NEW MOD 45 MIN: CPT | Performed by: OPHTHALMOLOGY

## 2023-01-12 ASSESSMENT — TONOMETRY
OD_IOP_MMHG: 16
OS_IOP_MMHG: 16

## 2023-01-12 ASSESSMENT — REFRACTION_MANIFEST
OD_CYLINDER: -2.25
OS_SPHERE: +2.00
OD_VA2: 20
OD_AXIS: 80
OD_VA1: 20/30
OS_AXIS: 100
OU_VA: 20
OD_SPHERE: +3.75
OS_CYLINDER: -1.00
OS_VA1: 20/20-2

## 2023-01-12 ASSESSMENT — SPHEQUIV_DERIVED
OS_SPHEQUIV: 1.5
OS_SPHEQUIV: 1.5
OD_SPHEQUIV: 2.625
OD_SPHEQUIV: 2.375

## 2023-01-12 ASSESSMENT — AXIALLENGTH_DERIVED
OD_AL: 22.162
OD_AL: 22.0764
OS_AL: 22.5087
OS_AL: 22.5087

## 2023-01-12 ASSESSMENT — REFRACTION_CURRENTRX
OD_CYLINDER: -2.00
OD_OVR_VA: 20/
OD_VPRISM_DIRECTION: BF
OS_CYLINDER: +0.75
OD_AXIS: 70
OS_SPHERE: +1.75
OS_VPRISM_DIRECTION: BF
OS_ADD: +2.75
OD_ADD: +2.75
OD_SPHERE: +3.50
OS_AXIS: 90
OS_OVR_VA: 20/

## 2023-01-12 ASSESSMENT — REFRACTION_AUTOREFRACTION
OD_SPHERE: +3.50
OS_SPHERE: +2.00
OD_CYLINDER: -2.25
OS_CYLINDER: -1.00
OS_AXIS: 101
OD_AXIS: 78

## 2023-01-12 ASSESSMENT — KERATOMETRY
OS_AXISANGLE_DEGREES: 34
OD_K2POWER_DIOPTERS: 45.50
OS_K1POWER_DIOPTERS: 44.75
OD_AXISANGLE_DEGREES: 7
OS_K2POWER_DIOPTERS: 45.25
OD_K1POWER_DIOPTERS: 44.75

## 2023-01-12 ASSESSMENT — VISUAL ACUITY
OS_BCVA: 20/30-2
OD_BCVA: 20/30-1

## 2023-01-12 ASSESSMENT — CONFRONTATIONAL VISUAL FIELD TEST (CVF)
OD_FINDINGS: FULL
OS_FINDINGS: FULL

## 2023-01-14 ENCOUNTER — RESULT REVIEW (OUTPATIENT)
Age: 73
End: 2023-01-14

## 2023-01-14 ENCOUNTER — APPOINTMENT (OUTPATIENT)
Dept: MAMMOGRAPHY | Facility: CLINIC | Age: 73
End: 2023-01-14
Payer: MEDICARE

## 2023-01-14 ENCOUNTER — OUTPATIENT (OUTPATIENT)
Dept: OUTPATIENT SERVICES | Facility: HOSPITAL | Age: 73
LOS: 1 days | End: 2023-01-14
Payer: MEDICARE

## 2023-01-14 DIAGNOSIS — I10 ESSENTIAL (PRIMARY) HYPERTENSION: ICD-10-CM

## 2023-01-14 DIAGNOSIS — Z96.643 PRESENCE OF ARTIFICIAL HIP JOINT, BILATERAL: Chronic | ICD-10-CM

## 2023-01-14 PROCEDURE — 77063 BREAST TOMOSYNTHESIS BI: CPT | Mod: 26

## 2023-01-14 PROCEDURE — 77063 BREAST TOMOSYNTHESIS BI: CPT

## 2023-01-14 PROCEDURE — 77067 SCR MAMMO BI INCL CAD: CPT

## 2023-01-14 PROCEDURE — 77067 SCR MAMMO BI INCL CAD: CPT | Mod: 26

## 2023-02-06 ENCOUNTER — RX RENEWAL (OUTPATIENT)
Age: 73
End: 2023-02-06

## 2023-03-01 ENCOUNTER — OFFICE (OUTPATIENT)
Dept: URBAN - METROPOLITAN AREA CLINIC 12 | Facility: CLINIC | Age: 73
Setting detail: OPHTHALMOLOGY
End: 2023-03-01
Payer: MEDICARE

## 2023-03-01 DIAGNOSIS — H25.11: ICD-10-CM

## 2023-03-01 DIAGNOSIS — H25.13: ICD-10-CM

## 2023-03-01 PROCEDURE — 99213 OFFICE O/P EST LOW 20 MIN: CPT | Performed by: OPHTHALMOLOGY

## 2023-03-01 PROCEDURE — 92136 OPHTHALMIC BIOMETRY: CPT | Performed by: OPHTHALMOLOGY

## 2023-03-01 ASSESSMENT — SPHEQUIV_DERIVED
OS_SPHEQUIV: 1.625
OS_SPHEQUIV: 1.5
OD_SPHEQUIV: 2.5
OD_SPHEQUIV: 2.625

## 2023-03-01 ASSESSMENT — AXIALLENGTH_DERIVED
OD_AL: 22.2001
OD_AL: 22.1571
OS_AL: 22.5087
OS_AL: 22.4644

## 2023-03-01 ASSESSMENT — VISUAL ACUITY
OD_BCVA: 20/40-1
OS_BCVA: 20/50

## 2023-03-01 ASSESSMENT — REFRACTION_MANIFEST
OS_CYLINDER: -1.00
OS_SPHERE: +2.00
OS_VA1: 20/20-2
OD_CYLINDER: -2.25
OS_AXIS: 100
OD_VA1: 20/30
OD_VA2: 20
OD_SPHERE: +3.75
OD_AXIS: 80
OU_VA: 20

## 2023-03-01 ASSESSMENT — KERATOMETRY
OS_AXISANGLE_DEGREES: 037
OD_K1POWER_DIOPTERS: 44.50
OD_K2POWER_DIOPTERS: 45.25
OS_K2POWER_DIOPTERS: 45.25
OD_AXISANGLE_DEGREES: 002
OS_K1POWER_DIOPTERS: 44.75

## 2023-03-01 ASSESSMENT — REFRACTION_CURRENTRX
OS_CYLINDER: -0.75
OS_AXIS: 084
OD_ADD: +2.75
OD_VPRISM_DIRECTION: BF
OS_SPHERE: +1.75
OD_SPHERE: +3.50
OD_AXIS: 077
OD_OVR_VA: 20/
OS_OVR_VA: 20/
OD_CYLINDER: -2.00
OS_ADD: +2.75
OS_VPRISM_DIRECTION: BF

## 2023-03-01 ASSESSMENT — REFRACTION_AUTOREFRACTION
OS_SPHERE: +2.00
OS_CYLINDER: -0.75
OD_CYLINDER: -2.50
OD_SPHERE: +3.75
OS_AXIS: 109
OD_AXIS: 071

## 2023-03-01 ASSESSMENT — TONOMETRY
OS_IOP_MMHG: 15
OD_IOP_MMHG: 12

## 2023-03-01 ASSESSMENT — CONFRONTATIONAL VISUAL FIELD TEST (CVF)
OS_FINDINGS: FULL
OD_FINDINGS: FULL

## 2023-03-10 ENCOUNTER — APPOINTMENT (OUTPATIENT)
Dept: INTERNAL MEDICINE | Facility: CLINIC | Age: 73
End: 2023-03-10
Payer: MEDICARE

## 2023-03-10 ENCOUNTER — NON-APPOINTMENT (OUTPATIENT)
Age: 73
End: 2023-03-10

## 2023-03-10 VITALS
BODY MASS INDEX: 19.99 KG/M2 | HEIGHT: 65 IN | WEIGHT: 120 LBS | TEMPERATURE: 98.2 F | SYSTOLIC BLOOD PRESSURE: 120 MMHG | DIASTOLIC BLOOD PRESSURE: 70 MMHG | RESPIRATION RATE: 14 BRPM | OXYGEN SATURATION: 98 % | HEART RATE: 80 BPM

## 2023-03-10 DIAGNOSIS — Z01.818 ENCOUNTER FOR OTHER PREPROCEDURAL EXAMINATION: ICD-10-CM

## 2023-03-10 DIAGNOSIS — H26.9 UNSPECIFIED CATARACT: ICD-10-CM

## 2023-03-10 PROCEDURE — 93000 ELECTROCARDIOGRAM COMPLETE: CPT | Mod: 59

## 2023-03-10 PROCEDURE — 99214 OFFICE O/P EST MOD 30 MIN: CPT | Mod: 25

## 2023-03-10 NOTE — HISTORY OF PRESENT ILLNESS
[No Pertinent Cardiac History] : no history of aortic stenosis, atrial fibrillation, coronary artery disease, recent myocardial infarction, or implantable device/pacemaker [No Pertinent Pulmonary History] : no history of asthma, COPD, sleep apnea, or smoking [No Adverse Anesthesia Reaction] : no adverse anesthesia reaction in self or family member [(Patient denies any chest pain, claudication, dyspnea on exertion, orthopnea, palpitations or syncope)] : Patient denies any chest pain, claudication, dyspnea on exertion, orthopnea, palpitations or syncope [Aortic Stenosis] : no aortic stenosis [Atrial Fibrillation] : no atrial fibrillation [Coronary Artery Disease] : no coronary artery disease [Recent Myocardial Infarction] : no recent myocardial infarction [Implantable Device/Pacemaker] : no implantable device/pacemaker [Asthma] : no asthma [COPD] : no COPD [Sleep Apnea] : no sleep apnea [Smoker] : not a smoker [Family Member] : no family member with adverse anesthesia reaction/sudden death [Self] : no previous adverse anesthesia reaction [Chronic Anticoagulation] : no chronic anticoagulation [Chronic Kidney Disease] : no chronic kidney disease [Diabetes] : no diabetes [FreeTextEntry1] : Bilateral Cataract surgery  [FreeTextEntry2] : 03/18/2022- 03/28/2023 [FreeTextEntry3] :  [FreeTextEntry4] : LAINE MOHAN is a 72 year old F who presents today for pre op clearance. Pt complains of left shoulder pain.

## 2023-03-10 NOTE — PHYSICAL EXAM
[No Acute Distress] : no acute distress [Well Nourished] : well nourished [Well Developed] : well developed [Well-Appearing] : well-appearing [Normal Voice/Communication] : normal voice/communication [Normal Sclera/Conjunctiva] : normal sclera/conjunctiva [PERRL] : pupils equal round and reactive to light [EOMI] : extraocular movements intact [Normal Outer Ear/Nose] : the outer ears and nose were normal in appearance [Normal Oropharynx] : the oropharynx was normal [No JVD] : no jugular venous distention [No Lymphadenopathy] : no lymphadenopathy [Supple] : supple [Thyroid Normal, No Nodules] : the thyroid was normal and there were no nodules present [No Respiratory Distress] : no respiratory distress  [No Accessory Muscle Use] : no accessory muscle use [Clear to Auscultation] : lungs were clear to auscultation bilaterally [Normal Rate] : normal rate  [Regular Rhythm] : with a regular rhythm [Normal S1, S2] : normal S1 and S2 [No Murmur] : no murmur heard [No Carotid Bruits] : no carotid bruits [No Abdominal Bruit] : a ~M bruit was not heard ~T in the abdomen [No Varicosities] : no varicosities [Pedal Pulses Present] : the pedal pulses are present [No Edema] : there was no peripheral edema [No Palpable Aorta] : no palpable aorta [No Extremity Clubbing/Cyanosis] : no extremity clubbing/cyanosis [Soft] : abdomen soft [Non Tender] : non-tender [Non-distended] : non-distended [No Masses] : no abdominal mass palpated [No HSM] : no HSM [Normal Bowel Sounds] : normal bowel sounds [Normal Supraclavicular Nodes] : no supraclavicular lymphadenopathy [Normal Posterior Cervical Nodes] : no posterior cervical lymphadenopathy [Normal Anterior Cervical Nodes] : no anterior cervical lymphadenopathy [No CVA Tenderness] : no CVA  tenderness [No Spinal Tenderness] : no spinal tenderness [No Joint Swelling] : no joint swelling [Grossly Normal Strength/Tone] : grossly normal strength/tone [No Rash] : no rash [Coordination Grossly Intact] : coordination grossly intact [No Focal Deficits] : no focal deficits [Normal Gait] : normal gait [Deep Tendon Reflexes (DTR)] : deep tendon reflexes were 2+ and symmetric [Speech Grossly Normal] : speech grossly normal [Memory Grossly Normal] : memory grossly normal [Normal Affect] : the affect was normal [Alert and Oriented x3] : oriented to person, place, and time [Normal Mood] : the mood was normal [Normal Insight/Judgement] : insight and judgment were intact [de-identified] : right shoulder limited range of motion

## 2023-03-10 NOTE — REVIEW OF SYSTEMS
[Vision Problems] : vision problems [Joint Pain] : joint pain [Negative] : Heme/Lymph [FreeTextEntry9] : right shoulder pain

## 2023-03-10 NOTE — END OF VISIT
[FreeTextEntry3] : "I, Kendrick Riddle, personally scribed the services dictated to me by Dr. Brendon Rouse MD in this documentation on 03/10/2023 " \par \par "I Dr. Brendon Rouse MD, personally performed the services described in this documentation on 03/10/2023 for the patient as scribed by Kendrick Riddle in my presence. I have reviewed and verified that all the information is accurate and true."

## 2023-03-10 NOTE — ASSESSMENT
[Patient Optimized for Surgery] : Patient optimized for surgery [Continue medications as is] : Continue current medications [FreeTextEntry4] : Cleared

## 2023-03-14 ENCOUNTER — ASC (OUTPATIENT)
Dept: URBAN - METROPOLITAN AREA SURGERY 8 | Facility: SURGERY | Age: 73
Setting detail: OPHTHALMOLOGY
End: 2023-03-14
Payer: MEDICARE

## 2023-03-14 DIAGNOSIS — H52.211: ICD-10-CM

## 2023-03-14 DIAGNOSIS — H25.11: ICD-10-CM

## 2023-03-14 PROCEDURE — FEMTO WITH CATARACT LASER: Performed by: OPHTHALMOLOGY

## 2023-03-14 PROCEDURE — 66984 XCAPSL CTRC RMVL W/O ECP: CPT | Performed by: OPHTHALMOLOGY

## 2023-03-14 PROCEDURE — A9270 NON-COVERED ITEM OR SERVICE: HCPCS | Performed by: OPHTHALMOLOGY

## 2023-03-14 PROCEDURE — 68841 INSJ RX ELUT IMPLT LAC CANAL: CPT | Performed by: OPHTHALMOLOGY

## 2023-03-15 ENCOUNTER — RX ONLY (RX ONLY)
Age: 73
End: 2023-03-15

## 2023-03-15 ENCOUNTER — OFFICE (OUTPATIENT)
Dept: URBAN - METROPOLITAN AREA CLINIC 12 | Facility: CLINIC | Age: 73
Setting detail: OPHTHALMOLOGY
End: 2023-03-15
Payer: MEDICARE

## 2023-03-15 DIAGNOSIS — Z96.1: ICD-10-CM

## 2023-03-15 PROCEDURE — 99024 POSTOP FOLLOW-UP VISIT: CPT | Performed by: OPTOMETRIST

## 2023-03-15 ASSESSMENT — KERATOMETRY
OD_AXISANGLE_DEGREES: 074
OS_AXISANGLE_DEGREES: 060
OD_K1POWER_DIOPTERS: 45.00
OS_K2POWER_DIOPTERS: 45.25
OS_K1POWER_DIOPTERS: 45.00
OD_K2POWER_DIOPTERS: 45.50
METHOD_AUTO_MANUAL: AUTO

## 2023-03-15 ASSESSMENT — REFRACTION_CURRENTRX
OS_ADD: +2.75
OD_CYLINDER: -2.00
OS_OVR_VA: 20/
OS_CYLINDER: -0.75
OS_AXIS: 084
OD_SPHERE: +3.50
OD_OVR_VA: 20/
OD_AXIS: 077
OD_ADD: +2.75
OD_VPRISM_DIRECTION: BF
OS_SPHERE: +1.75
OS_VPRISM_DIRECTION: BF

## 2023-03-15 ASSESSMENT — REFRACTION_MANIFEST
OD_VA2: 20
OS_SPHERE: +2.00
OS_CYLINDER: -1.00
OU_VA: 20
OD_SPHERE: +3.75
OS_AXIS: 100
OD_CYLINDER: -2.25
OD_AXIS: 80
OD_VA1: 20/30
OS_VA1: 20/20-2

## 2023-03-15 ASSESSMENT — VISUAL ACUITY
OD_BCVA: 20/60+
OS_BCVA: 20/30-

## 2023-03-15 ASSESSMENT — SPHEQUIV_DERIVED
OD_SPHEQUIV: 2.625
OS_SPHEQUIV: 1.5
OS_SPHEQUIV: 1.75

## 2023-03-15 ASSESSMENT — REFRACTION_AUTOREFRACTION
OS_CYLINDER: -0.50
OD_SPHERE: PLANO
OS_AXIS: 107
OD_AXIS: 118
OD_CYLINDER: -0.25
OS_SPHERE: +2.00

## 2023-03-15 ASSESSMENT — AXIALLENGTH_DERIVED
OS_AL: 22.4669
OD_AL: 22.0362
OS_AL: 22.379

## 2023-03-15 ASSESSMENT — TONOMETRY
OS_IOP_MMHG: 12
OD_IOP_MMHG: 15

## 2023-03-15 ASSESSMENT — CONFRONTATIONAL VISUAL FIELD TEST (CVF)
OS_FINDINGS: FULL
OD_FINDINGS: FULL

## 2023-03-20 ENCOUNTER — OFFICE (OUTPATIENT)
Dept: URBAN - METROPOLITAN AREA CLINIC 12 | Facility: CLINIC | Age: 73
Setting detail: OPHTHALMOLOGY
End: 2023-03-20
Payer: MEDICARE

## 2023-03-20 DIAGNOSIS — H25.12: ICD-10-CM

## 2023-03-20 PROCEDURE — 92136 OPHTHALMIC BIOMETRY: CPT | Performed by: OPHTHALMOLOGY

## 2023-03-20 ASSESSMENT — CONFRONTATIONAL VISUAL FIELD TEST (CVF)
OD_FINDINGS: FULL
OS_FINDINGS: FULL

## 2023-03-20 ASSESSMENT — TONOMETRY
OS_IOP_MMHG: 15
OD_IOP_MMHG: 18

## 2023-03-21 NOTE — ED PROVIDER NOTE - CPE EDP [facility] NORM
Radiology Post-Procedure Note    Pre Op Diagnosis: Acomm aneurysm     Post Op Diagnosis: See section below     Procedure: Cerebral angiogram    Procedure performed by: Michael Reyes MD    Written Informed Consent Obtained: Yes    Specimen Removed: NO    Estimated Blood Loss: Minimal    Procedure report:     A 5F sheath was placed into the right femoral artery and a 5F Som catheter was advanced into the aortic arch.  Six vessel angiography of the brain was performed after injection into each of these vessels.    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    A right femoral artery angiogram was performed, the sheath removed and hemostasis achieved using vascade.  No hematoma was present at the time of hemostasis.    The patient tolerated the procedure well.         Russell Triana MD     Neuro Endovascular Surgery Fellow   [facility]     normal...

## 2023-03-22 ASSESSMENT — REFRACTION_AUTOREFRACTION
OS_AXIS: 098
OD_SPHERE: -0.25
OS_CYLINDER: -0.50
OS_SPHERE: +1.50
OD_CYLINDER: -0.50
OD_AXIS: 117

## 2023-03-22 ASSESSMENT — REFRACTION_CURRENTRX
OD_VPRISM_DIRECTION: BF
OS_AXIS: 084
OD_ADD: +2.75
OD_CYLINDER: -2.00
OD_OVR_VA: 20/
OS_VPRISM_DIRECTION: BF
OS_ADD: +2.75
OD_SPHERE: +3.50
OS_CYLINDER: -0.75
OD_AXIS: 077
OS_SPHERE: +1.75
OS_OVR_VA: 20/

## 2023-03-22 ASSESSMENT — KERATOMETRY
OD_AXISANGLE_DEGREES: 073
OD_K2POWER_DIOPTERS: 45.50
OS_K1POWER_DIOPTERS: 44.75
OS_K2POWER_DIOPTERS: 45.00
OD_K1POWER_DIOPTERS: 45.25
OS_AXISANGLE_DEGREES: 037
METHOD_AUTO_MANUAL: AUTO

## 2023-03-22 ASSESSMENT — SPHEQUIV_DERIVED
OD_SPHEQUIV: 2.625
OS_SPHEQUIV: 1.25
OS_SPHEQUIV: 1.5
OD_SPHEQUIV: -0.5

## 2023-03-22 ASSESSMENT — REFRACTION_MANIFEST
OD_VA1: 20/30
OU_VA: 20
OS_CYLINDER: -1.00
OD_VA2: 20
OD_SPHERE: +3.75
OD_AXIS: 80
OS_AXIS: 100
OS_VA1: 20/20-2
OD_CYLINDER: -2.25
OS_SPHERE: +2.00

## 2023-03-22 ASSESSMENT — VISUAL ACUITY
OS_BCVA: 20/50
OD_BCVA: 20/60-

## 2023-03-22 ASSESSMENT — AXIALLENGTH_DERIVED
OS_AL: 22.6399
OD_AL: 21.9962
OS_AL: 22.5505
OD_AL: 23.1081

## 2023-03-28 ENCOUNTER — ASC (OUTPATIENT)
Dept: URBAN - METROPOLITAN AREA SURGERY 8 | Facility: SURGERY | Age: 73
Setting detail: OPHTHALMOLOGY
End: 2023-03-28
Payer: MEDICARE

## 2023-03-28 DIAGNOSIS — H25.12: ICD-10-CM

## 2023-03-28 DIAGNOSIS — H52.212: ICD-10-CM

## 2023-03-28 PROCEDURE — A9270 NON-COVERED ITEM OR SERVICE: HCPCS | Performed by: OPHTHALMOLOGY

## 2023-03-28 PROCEDURE — FEMTO FEMTOSECOND LASER: Performed by: OPHTHALMOLOGY

## 2023-03-28 PROCEDURE — 68841 INSJ RX ELUT IMPLT LAC CANAL: CPT | Performed by: OPHTHALMOLOGY

## 2023-03-28 PROCEDURE — 66984 XCAPSL CTRC RMVL W/O ECP: CPT | Performed by: OPHTHALMOLOGY

## 2023-03-29 ENCOUNTER — OFFICE (OUTPATIENT)
Dept: URBAN - METROPOLITAN AREA CLINIC 12 | Facility: CLINIC | Age: 73
Setting detail: OPHTHALMOLOGY
End: 2023-03-29
Payer: MEDICARE

## 2023-03-29 DIAGNOSIS — Z96.1: ICD-10-CM

## 2023-03-29 PROCEDURE — 99024 POSTOP FOLLOW-UP VISIT: CPT | Performed by: OPTOMETRIST

## 2023-03-29 ASSESSMENT — REFRACTION_MANIFEST
OD_VA1: 20/30
OS_SPHERE: +2.00
OS_VA1: 20/20-2
OD_VA2: 20
OU_VA: 20
OD_AXIS: 80
OD_CYLINDER: -2.25
OD_SPHERE: +3.75
OS_AXIS: 100
OS_CYLINDER: -1.00

## 2023-03-29 ASSESSMENT — AXIALLENGTH_DERIVED
OD_AL: 21.8772
OS_AL: 22.9767
OS_AL: 22.2607
OD_AL: 22.9767

## 2023-03-29 ASSESSMENT — REFRACTION_CURRENTRX
OS_CYLINDER: -0.75
OD_CYLINDER: -2.00
OS_VPRISM_DIRECTION: BF
OD_VPRISM_DIRECTION: BF
OD_SPHERE: +3.50
OS_ADD: +2.75
OS_OVR_VA: 20/
OD_AXIS: 077
OD_ADD: +2.75
OS_SPHERE: +1.75
OD_OVR_VA: 20/
OS_AXIS: 084

## 2023-03-29 ASSESSMENT — VISUAL ACUITY
OS_BCVA: 20/30-1
OD_BCVA: 20/30

## 2023-03-29 ASSESSMENT — SPHEQUIV_DERIVED
OS_SPHEQUIV: 1.5
OS_SPHEQUIV: -0.5
OD_SPHEQUIV: -0.5
OD_SPHEQUIV: 2.625

## 2023-03-29 ASSESSMENT — TONOMETRY
OS_IOP_MMHG: 14
OD_IOP_MMHG: 15

## 2023-03-29 ASSESSMENT — KERATOMETRY
OD_AXISANGLE_DEGREES: 070
OD_K1POWER_DIOPTERS: 45.25
METHOD_AUTO_MANUAL: AUTO
OS_AXISANGLE_DEGREES: 042
OD_K2POWER_DIOPTERS: 46.25
OS_K1POWER_DIOPTERS: 45.00
OS_K2POWER_DIOPTERS: 46.50

## 2023-03-29 ASSESSMENT — REFRACTION_AUTOREFRACTION
OD_AXIS: 122
OD_CYLINDER: -0.50
OS_CYLINDER: -1.00
OD_SPHERE: -0.25
OS_AXIS: 120
OS_SPHERE: 0.00

## 2023-03-29 ASSESSMENT — CONFRONTATIONAL VISUAL FIELD TEST (CVF)
OS_FINDINGS: FULL
OD_FINDINGS: FULL

## 2023-04-03 ENCOUNTER — OFFICE (OUTPATIENT)
Dept: URBAN - METROPOLITAN AREA CLINIC 12 | Facility: CLINIC | Age: 73
Setting detail: OPHTHALMOLOGY
End: 2023-04-03
Payer: MEDICARE

## 2023-04-03 DIAGNOSIS — Z96.1: ICD-10-CM

## 2023-04-03 PROCEDURE — 99024 POSTOP FOLLOW-UP VISIT: CPT | Performed by: OPTOMETRIST

## 2023-04-03 ASSESSMENT — REFRACTION_CURRENTRX
OS_SPHERE: +1.75
OD_OVR_VA: 20/
OD_SPHERE: +3.50
OD_ADD: +2.75
OS_AXIS: 084
OD_VPRISM_DIRECTION: BF
OS_CYLINDER: -0.75
OS_ADD: +2.75
OS_OVR_VA: 20/
OD_AXIS: 077
OD_CYLINDER: -2.00
OS_VPRISM_DIRECTION: BF

## 2023-04-03 ASSESSMENT — SPHEQUIV_DERIVED
OS_SPHEQUIV: 1.5
OD_SPHEQUIV: 2.625

## 2023-04-03 ASSESSMENT — VISUAL ACUITY
OD_BCVA: 20/40+2
OS_BCVA: 20/40

## 2023-04-03 ASSESSMENT — KERATOMETRY
METHOD_AUTO_MANUAL: AUTO
OD_K1POWER_DIOPTERS: 45.25
OS_K2POWER_DIOPTERS: 45.25
OS_AXISANGLE_DEGREES: 005
OS_K1POWER_DIOPTERS: 45.00
OD_AXISANGLE_DEGREES: 064
OD_K2POWER_DIOPTERS: 45.75

## 2023-04-03 ASSESSMENT — REFRACTION_MANIFEST
OS_AXIS: 100
OD_SPHERE: +3.75
OD_CYLINDER: -2.25
OD_VA1: 20/30
OS_SPHERE: +2.00
OS_CYLINDER: -1.00
OS_VA1: 20/20-2
OD_AXIS: 80
OU_VA: 20
OD_VA2: 20

## 2023-04-03 ASSESSMENT — AXIALLENGTH_DERIVED
OD_AL: 21.9564
OS_AL: 22.4669

## 2023-04-03 ASSESSMENT — REFRACTION_AUTOREFRACTION
OS_SPHERE: PLANO
OS_CYLINDER: -0.75
OD_CYLINDER: -0.75
OD_AXIS: 114
OS_AXIS: 110
OD_SPHERE: PLANO

## 2023-04-03 ASSESSMENT — TONOMETRY
OS_IOP_MMHG: 13
OD_IOP_MMHG: 15

## 2023-04-03 ASSESSMENT — CONFRONTATIONAL VISUAL FIELD TEST (CVF)
OD_FINDINGS: FULL
OS_FINDINGS: FULL

## 2023-04-26 ENCOUNTER — RX ONLY (RX ONLY)
Age: 73
End: 2023-04-26

## 2023-04-26 ENCOUNTER — OFFICE (OUTPATIENT)
Dept: URBAN - METROPOLITAN AREA CLINIC 12 | Facility: CLINIC | Age: 73
Setting detail: OPHTHALMOLOGY
End: 2023-04-26
Payer: MEDICARE

## 2023-04-26 DIAGNOSIS — Z96.1: ICD-10-CM

## 2023-04-26 PROBLEM — H16.223 DRY EYE SYNDROME K SICCA; BOTH EYES: Status: ACTIVE | Noted: 2023-04-26

## 2023-04-26 PROCEDURE — 99024 POSTOP FOLLOW-UP VISIT: CPT | Performed by: OPTOMETRIST

## 2023-04-26 ASSESSMENT — REFRACTION_MANIFEST
OD_AXIS: 000
OS_SPHERE: PLANO
OD_ADD: +1.50
OD_VA1: 20/30
OD_CYLINDER: SPHERE
OS_VA1: 20/20-2
OS_AXIS: 000
OS_CYLINDER: SPHERE
OU_VA: 20
OS_ADD: +1.50
OD_SPHERE: PLANO
OD_VA2: 20

## 2023-04-26 ASSESSMENT — REFRACTION_CURRENTRX
OS_SPHERE: +1.75
OS_CYLINDER: -0.75
OD_OVR_VA: 20/
OD_AXIS: 077
OS_AXIS: 084
OS_VPRISM_DIRECTION: BF
OS_ADD: +2.75
OD_SPHERE: +3.50
OD_VPRISM_DIRECTION: BF
OS_OVR_VA: 20/
OD_CYLINDER: -2.00
OD_ADD: +2.75

## 2023-04-26 ASSESSMENT — SPHEQUIV_DERIVED
OD_SPHEQUIV: -0.125
OS_SPHEQUIV: -0.375

## 2023-04-26 ASSESSMENT — REFRACTION_AUTOREFRACTION
OD_CYLINDER: -0.75
OS_CYLINDER: -0.25
OS_SPHERE: -0.25
OD_AXIS: 114
OS_AXIS: 120
OD_SPHERE: +0.25

## 2023-04-26 ASSESSMENT — TONOMETRY
OS_IOP_MMHG: 13
OD_IOP_MMHG: 15

## 2023-04-26 ASSESSMENT — KERATOMETRY
OD_AXISANGLE_DEGREES: 037
METHOD_AUTO_MANUAL: AUTO
OS_K1POWER_DIOPTERS: 45.00
OS_AXISANGLE_DEGREES: 087
OD_K1POWER_DIOPTERS: 45.25
OS_K2POWER_DIOPTERS: 45.50
OD_K2POWER_DIOPTERS: 45.50

## 2023-04-26 ASSESSMENT — AXIALLENGTH_DERIVED
OD_AL: 22.9688
OS_AL: 23.1055

## 2023-04-26 ASSESSMENT — CONFRONTATIONAL VISUAL FIELD TEST (CVF)
OS_FINDINGS: FULL
OD_FINDINGS: FULL

## 2023-04-26 ASSESSMENT — SUPERFICIAL PUNCTATE KERATITIS (SPK)
OD_SPK: T
OS_SPK: T

## 2023-04-26 ASSESSMENT — VISUAL ACUITY
OS_BCVA: 20/40-1
OD_BCVA: 20/30

## 2023-05-10 NOTE — ASSESSMENT
Pharmacy is calling regarding Kushalpranav Barboza prescription:    Medication: oxyCODONE-acetaminophen (Percocet)  Dose:  MG  Quantity: 120 tablet   Dosing Instructions: Take 1 tablet by mouth 4 times daily as needed for Pain.   Refills requested: N/A    Following information being requested:  Refill  Urgency: Routine    Please call pharmacy back at   [id number] [address] AT [address] [place] [id number]  Phone: [phone] Fax: [phone]      Pharmacy was advised that the Clinic will call them back within 24-72 hours, unless this is a STAT request.       [FreeTextEntry1] : EKG sinus within normal limits \par decrease Amlodipine to 2.5 mg\par return 2 weeks \par recommend PT

## 2023-05-24 ENCOUNTER — APPOINTMENT (OUTPATIENT)
Dept: INTERNAL MEDICINE | Facility: CLINIC | Age: 73
End: 2023-05-24
Payer: MEDICARE

## 2023-05-24 ENCOUNTER — NON-APPOINTMENT (OUTPATIENT)
Age: 73
End: 2023-05-24

## 2023-05-24 VITALS — SYSTOLIC BLOOD PRESSURE: 136 MMHG | DIASTOLIC BLOOD PRESSURE: 80 MMHG

## 2023-05-24 VITALS
TEMPERATURE: 98 F | BODY MASS INDEX: 19.99 KG/M2 | WEIGHT: 120 LBS | HEART RATE: 75 BPM | DIASTOLIC BLOOD PRESSURE: 84 MMHG | OXYGEN SATURATION: 98 % | HEIGHT: 65 IN | SYSTOLIC BLOOD PRESSURE: 144 MMHG | RESPIRATION RATE: 14 BRPM

## 2023-05-24 PROCEDURE — 99214 OFFICE O/P EST MOD 30 MIN: CPT | Mod: 25

## 2023-05-24 PROCEDURE — 93000 ELECTROCARDIOGRAM COMPLETE: CPT | Mod: 59

## 2023-05-24 NOTE — REVIEW OF SYSTEMS
[Back Pain] : back pain [Fatigue] : fatigue [Negative] : Heme/Lymph [Palpitations] : palpitations [Anxiety] : anxiety [FreeTextEntry5] : chest tightness

## 2023-05-24 NOTE — HEALTH RISK ASSESSMENT
[No] : In the past 12 months have you used drugs other than those required for medical reasons? No [No falls in past year] : Patient reported no falls in the past year [0] : 2) Feeling down, depressed, or hopeless: Not at all (0) [PHQ-2 Negative - No further assessment needed] : PHQ-2 Negative - No further assessment needed [Former] : Former

## 2023-05-24 NOTE — PHYSICAL EXAM
[No Acute Distress] : no acute distress [Well Nourished] : well nourished [Well Developed] : well developed [Well-Appearing] : well-appearing [Normal Voice/Communication] : normal voice/communication [Normal Sclera/Conjunctiva] : normal sclera/conjunctiva [PERRL] : pupils equal round and reactive to light [EOMI] : extraocular movements intact [Normal Outer Ear/Nose] : the outer ears and nose were normal in appearance [Normal Oropharynx] : the oropharynx was normal [No JVD] : no jugular venous distention [No Lymphadenopathy] : no lymphadenopathy [Supple] : supple [Thyroid Normal, No Nodules] : the thyroid was normal and there were no nodules present [No Respiratory Distress] : no respiratory distress  [No Accessory Muscle Use] : no accessory muscle use [Clear to Auscultation] : lungs were clear to auscultation bilaterally [Normal Rate] : normal rate  [Regular Rhythm] : with a regular rhythm [Normal S1, S2] : normal S1 and S2 [No Murmur] : no murmur heard [No Carotid Bruits] : no carotid bruits [No Abdominal Bruit] : a ~M bruit was not heard ~T in the abdomen [No Varicosities] : no varicosities [Pedal Pulses Present] : the pedal pulses are present [No Edema] : there was no peripheral edema [No Palpable Aorta] : no palpable aorta [No Extremity Clubbing/Cyanosis] : no extremity clubbing/cyanosis [Soft] : abdomen soft [Non Tender] : non-tender [Non-distended] : non-distended [No Masses] : no abdominal mass palpated [No HSM] : no HSM [Normal Bowel Sounds] : normal bowel sounds [Normal Supraclavicular Nodes] : no supraclavicular lymphadenopathy [Normal Posterior Cervical Nodes] : no posterior cervical lymphadenopathy [Normal Anterior Cervical Nodes] : no anterior cervical lymphadenopathy [No CVA Tenderness] : no CVA  tenderness [No Spinal Tenderness] : no spinal tenderness [No Joint Swelling] : no joint swelling [Grossly Normal Strength/Tone] : grossly normal strength/tone [No Rash] : no rash [Coordination Grossly Intact] : coordination grossly intact [No Focal Deficits] : no focal deficits [Normal Gait] : normal gait [Deep Tendon Reflexes (DTR)] : deep tendon reflexes were 2+ and symmetric [Speech Grossly Normal] : speech grossly normal [Normal Affect] : the affect was normal [Alert and Oriented x3] : oriented to person, place, and time [Normal Mood] : the mood was normal [Normal Insight/Judgement] : insight and judgment were intact

## 2023-05-24 NOTE — HISTORY OF PRESENT ILLNESS
[FreeTextEntry8] : Patient is a 72 year old woman here for an acute visit\par Pt complains of back pain, no pain radiating down her legs. This pain started yesterday. [FreeTextEntry1] : Patient is a 71 y/o here for follow up.  \par  [de-identified] : Patient is a 73 y/o here for follow up. She has a history of HTN. \par Pt believes she is having an atrial fib. She feels a squeeze in her chest. She also feels fatigue.\par has tachycardia \par needs to renew Xanax

## 2023-05-24 NOTE — HISTORY OF PRESENT ILLNESS
[FreeTextEntry8] : Patient is a 72 year old woman here for an acute visit\par Pt complains of back pain, no pain radiating down her legs. This pain started yesterday. [FreeTextEntry1] : Patient is a 73 y/o here for follow up.  \par  [de-identified] : Patient is a 71 y/o here for follow up. She has a history of HTN. \par Pt believes she is having an atrial fib. She feels a squeeze in her chest. She also feels fatigue.\par has tachycardia \par needs to renew Xanax

## 2023-05-24 NOTE — PLAN
[FreeTextEntry1] : referred to Cardiologist Dr Jacobs \par Continue medications \par Further instructions pending lab results

## 2023-05-25 LAB
ALBUMIN SERPL ELPH-MCNC: 4.5 G/DL
ALP BLD-CCNC: 74 U/L
ALT SERPL-CCNC: 18 U/L
ANION GAP SERPL CALC-SCNC: 13 MMOL/L
AST SERPL-CCNC: 24 U/L
BILIRUB SERPL-MCNC: 0.2 MG/DL
BUN SERPL-MCNC: 24 MG/DL
CALCIUM SERPL-MCNC: 9.3 MG/DL
CHLORIDE SERPL-SCNC: 99 MMOL/L
CHOLEST SERPL-MCNC: 211 MG/DL
CK SERPL-CCNC: 182 U/L
CO2 SERPL-SCNC: 23 MMOL/L
CREAT SERPL-MCNC: 1.4 MG/DL
EGFR: 40 ML/MIN/1.73M2
ESTIMATED AVERAGE GLUCOSE: 123 MG/DL
GLUCOSE SERPL-MCNC: 101 MG/DL
HBA1C MFR BLD HPLC: 5.9 %
HDLC SERPL-MCNC: 72 MG/DL
LDLC SERPL CALC-MCNC: 123 MG/DL
NONHDLC SERPL-MCNC: 139 MG/DL
POTASSIUM SERPL-SCNC: 5.2 MMOL/L
PROT SERPL-MCNC: 6.9 G/DL
SODIUM SERPL-SCNC: 135 MMOL/L
TRIGL SERPL-MCNC: 82 MG/DL
TSH SERPL-ACNC: 1.36 UIU/ML

## 2023-05-26 LAB
FOLATE SERPL-MCNC: 15.6 NG/ML
IRON SATN MFR SERPL: 27 %
IRON SERPL-MCNC: 79 UG/DL
TIBC SERPL-MCNC: 292 UG/DL
UIBC SERPL-MCNC: 212 UG/DL
VIT B12 SERPL-MCNC: 331 PG/ML

## 2023-06-05 ENCOUNTER — RX RENEWAL (OUTPATIENT)
Age: 73
End: 2023-06-05

## 2023-06-07 ENCOUNTER — APPOINTMENT (OUTPATIENT)
Dept: INTERNAL MEDICINE | Facility: CLINIC | Age: 73
End: 2023-06-07
Payer: MEDICARE

## 2023-06-07 VITALS
RESPIRATION RATE: 14 BRPM | SYSTOLIC BLOOD PRESSURE: 120 MMHG | HEART RATE: 70 BPM | WEIGHT: 121 LBS | TEMPERATURE: 98.1 F | HEIGHT: 65 IN | OXYGEN SATURATION: 96 % | BODY MASS INDEX: 20.16 KG/M2 | DIASTOLIC BLOOD PRESSURE: 76 MMHG

## 2023-06-07 DIAGNOSIS — M54.9 DORSALGIA, UNSPECIFIED: ICD-10-CM

## 2023-06-07 PROCEDURE — 99214 OFFICE O/P EST MOD 30 MIN: CPT

## 2023-06-07 NOTE — END OF VISIT
[FreeTextEntry3] : "I, Kendrick Riddle, personally scribed the services dictated to me by Dr. Brendon Rouse MD in this documentation on 06/07/2023 " \par \par "I Dr. Brendon Rouse MD, personally performed the services described in this documentation on 06/07/2023 for the patient as scribed by Kendrick Riddle in my presence. I have reviewed and verified that all the information is accurate and true."

## 2023-06-07 NOTE — PHYSICAL EXAM
[No Acute Distress] : no acute distress [Well Nourished] : well nourished [Well Developed] : well developed [Well-Appearing] : well-appearing [Normal Voice/Communication] : normal voice/communication [Normal Sclera/Conjunctiva] : normal sclera/conjunctiva [PERRL] : pupils equal round and reactive to light [EOMI] : extraocular movements intact [Normal Outer Ear/Nose] : the outer ears and nose were normal in appearance [Normal Oropharynx] : the oropharynx was normal [Normal TMs] : both tympanic membranes were normal [No JVD] : no jugular venous distention [No Lymphadenopathy] : no lymphadenopathy [Supple] : supple [Thyroid Normal, No Nodules] : the thyroid was normal and there were no nodules present [No Respiratory Distress] : no respiratory distress  [No Accessory Muscle Use] : no accessory muscle use [Clear to Auscultation] : lungs were clear to auscultation bilaterally [Normal Rate] : normal rate  [Regular Rhythm] : with a regular rhythm [Normal S1, S2] : normal S1 and S2 [No Murmur] : no murmur heard [No Carotid Bruits] : no carotid bruits [No Abdominal Bruit] : a ~M bruit was not heard ~T in the abdomen [No Varicosities] : no varicosities [Pedal Pulses Present] : the pedal pulses are present [No Edema] : there was no peripheral edema [No Palpable Aorta] : no palpable aorta [No Extremity Clubbing/Cyanosis] : no extremity clubbing/cyanosis [Soft] : abdomen soft [Non Tender] : non-tender [Non-distended] : non-distended [No Masses] : no abdominal mass palpated [No HSM] : no HSM [Normal Bowel Sounds] : normal bowel sounds [Normal Supraclavicular Nodes] : no supraclavicular lymphadenopathy [Normal Posterior Cervical Nodes] : no posterior cervical lymphadenopathy [Normal Anterior Cervical Nodes] : no anterior cervical lymphadenopathy [No CVA Tenderness] : no CVA  tenderness [No Spinal Tenderness] : no spinal tenderness [No Joint Swelling] : no joint swelling [Grossly Normal Strength/Tone] : grossly normal strength/tone [No Rash] : no rash [Coordination Grossly Intact] : coordination grossly intact [No Focal Deficits] : no focal deficits [Normal Gait] : normal gait [Deep Tendon Reflexes (DTR)] : deep tendon reflexes were 2+ and symmetric [Speech Grossly Normal] : speech grossly normal [Memory Grossly Normal] : memory grossly normal [Normal Affect] : the affect was normal [Normal Mood] : the mood was normal [Normal Insight/Judgement] : insight and judgment were intact

## 2023-06-07 NOTE — HISTORY OF PRESENT ILLNESS
[FreeTextEntry1] : follow up  [de-identified] : LAINE MOHAN is a 72 year old F who presents today for follow up for back pain. Pt still has back pain radiating down. Pt went to ER 4 days ago but was not admitted. Pt feels better today. Pt has hx of HTN has Anemia \par feels better on Robaxin

## 2023-06-07 NOTE — HEALTH RISK ASSESSMENT
[Never (0 pts)] : Never (0 points) [No] : In the past 12 months have you used drugs other than those required for medical reasons? No [No falls in past year] : Patient reported no falls in the past year [0] : 2) Feeling down, depressed, or hopeless: Not at all (0) [PHQ-2 Negative - No further assessment needed] : PHQ-2 Negative - No further assessment needed [Never] : Never [[id number]] : 0

## 2023-06-27 ENCOUNTER — APPOINTMENT (OUTPATIENT)
Dept: CARDIOLOGY | Facility: CLINIC | Age: 73
End: 2023-06-27
Payer: MEDICARE

## 2023-06-27 ENCOUNTER — NON-APPOINTMENT (OUTPATIENT)
Age: 73
End: 2023-06-27

## 2023-06-27 VITALS
HEIGHT: 65 IN | SYSTOLIC BLOOD PRESSURE: 151 MMHG | HEART RATE: 85 BPM | WEIGHT: 123 LBS | OXYGEN SATURATION: 98 % | DIASTOLIC BLOOD PRESSURE: 94 MMHG | BODY MASS INDEX: 20.49 KG/M2

## 2023-06-27 DIAGNOSIS — R07.9 CHEST PAIN, UNSPECIFIED: ICD-10-CM

## 2023-06-27 DIAGNOSIS — R06.09 OTHER FORMS OF DYSPNEA: ICD-10-CM

## 2023-06-27 PROCEDURE — 93000 ELECTROCARDIOGRAM COMPLETE: CPT

## 2023-06-27 PROCEDURE — 99215 OFFICE O/P EST HI 40 MIN: CPT

## 2023-06-27 RX ORDER — LIDOCAINE 5% 700 MG/1
5 PATCH TOPICAL
Qty: 30 | Refills: 2 | Status: DISCONTINUED | COMMUNITY
Start: 2023-06-07 | End: 2023-06-27

## 2023-06-27 RX ORDER — AZITHROMYCIN 250 MG/1
250 TABLET, FILM COATED ORAL
Qty: 1 | Refills: 0 | Status: DISCONTINUED | COMMUNITY
Start: 2022-12-27 | End: 2023-06-27

## 2023-06-27 RX ORDER — AMLODIPINE BESYLATE 2.5 MG/1
2.5 TABLET ORAL
Qty: 90 | Refills: 1 | Status: DISCONTINUED | COMMUNITY
Start: 2022-02-28 | End: 2023-06-27

## 2023-06-27 RX ORDER — METAXALONE 800 MG/1
800 TABLET ORAL 3 TIMES DAILY
Qty: 30 | Refills: 1 | Status: DISCONTINUED | COMMUNITY
Start: 2021-11-08 | End: 2023-06-27

## 2023-06-27 RX ORDER — METHYLPREDNISOLONE 4 MG/1
4 TABLET ORAL
Qty: 1 | Refills: 0 | Status: DISCONTINUED | COMMUNITY
Start: 2023-06-07 | End: 2023-06-27

## 2023-06-27 RX ORDER — FLUTICASONE PROPIONATE 50 UG/1
50 SPRAY, METERED NASAL DAILY
Qty: 1 | Refills: 0 | Status: DISCONTINUED | COMMUNITY
Start: 2022-12-27 | End: 2023-06-27

## 2023-07-03 ENCOUNTER — APPOINTMENT (OUTPATIENT)
Dept: CARDIOLOGY | Facility: CLINIC | Age: 73
End: 2023-07-03
Payer: MEDICARE

## 2023-07-03 PROCEDURE — 93306 TTE W/DOPPLER COMPLETE: CPT

## 2023-07-06 ENCOUNTER — APPOINTMENT (OUTPATIENT)
Dept: CT IMAGING | Facility: CLINIC | Age: 73
End: 2023-07-06
Payer: MEDICARE

## 2023-07-06 PROCEDURE — 75574 CT ANGIO HRT W/3D IMAGE: CPT

## 2023-07-06 PROCEDURE — 82565 ASSAY OF CREATININE: CPT | Mod: QW

## 2023-07-26 ENCOUNTER — OFFICE (OUTPATIENT)
Dept: URBAN - METROPOLITAN AREA CLINIC 12 | Facility: CLINIC | Age: 73
Setting detail: OPHTHALMOLOGY
End: 2023-07-26
Payer: MEDICARE

## 2023-07-26 DIAGNOSIS — H43.393: ICD-10-CM

## 2023-07-26 DIAGNOSIS — H43.813: ICD-10-CM

## 2023-07-26 DIAGNOSIS — Z96.1: ICD-10-CM

## 2023-07-26 DIAGNOSIS — H16.223: ICD-10-CM

## 2023-07-26 PROBLEM — H52.7 REFRACTIVE ERROR: Status: ACTIVE | Noted: 2023-07-26

## 2023-07-26 PROCEDURE — 92014 COMPRE OPH EXAM EST PT 1/>: CPT | Performed by: OPTOMETRIST

## 2023-07-26 ASSESSMENT — KERATOMETRY
OD_AXISANGLE_DEGREES: 063
OS_K2POWER_DIOPTERS: 45.25
OS_K1POWER_DIOPTERS: 45.00
OD_K1POWER_DIOPTERS: 45.25
METHOD_AUTO_MANUAL: AUTO
OD_K2POWER_DIOPTERS: 45.50
OS_AXISANGLE_DEGREES: 055

## 2023-07-26 ASSESSMENT — AXIALLENGTH_DERIVED
OS_AL: 23.0098
OD_AL: 23.0151

## 2023-07-26 ASSESSMENT — REFRACTION_CURRENTRX
OS_OVR_VA: 20/
OD_ADD: +2.75
OD_OVR_VA: 20/
OS_SPHERE: +1.75
OD_SPHERE: +3.50
OS_VPRISM_DIRECTION: BF
OS_CYLINDER: -0.75
OD_VPRISM_DIRECTION: BF
OS_ADD: +2.75
OD_CYLINDER: -2.00
OS_AXIS: 084
OD_AXIS: 077

## 2023-07-26 ASSESSMENT — VISUAL ACUITY
OS_BCVA: 20/25-
OD_BCVA: 20/30-

## 2023-07-26 ASSESSMENT — REFRACTION_AUTOREFRACTION
OS_CYLINDER: -0.50
OD_SPHERE: 0.00
OS_SPHERE: +0.25
OS_AXIS: 084
OD_AXIS: 114
OD_CYLINDER: -0.50

## 2023-07-26 ASSESSMENT — SPHEQUIV_DERIVED
OS_SPHEQUIV: 0
OD_SPHEQUIV: -0.25

## 2023-07-26 ASSESSMENT — REFRACTION_MANIFEST
OD_VA2: 20
OS_CYLINDER: SPHERE
OD_CYLINDER: SPHERE
OS_ADD: +1.50
OD_AXIS: 000
OS_AXIS: 000
OU_VA: 20
OD_SPHERE: PLANO
OS_SPHERE: PLANO
OD_VA1: 20/30
OS_VA1: 20/20-2
OD_ADD: +1.50

## 2023-07-26 ASSESSMENT — SUPERFICIAL PUNCTATE KERATITIS (SPK)
OS_SPK: T
OD_SPK: T

## 2023-07-26 ASSESSMENT — TONOMETRY: OS_IOP_MMHG: 12

## 2023-07-26 ASSESSMENT — CONFRONTATIONAL VISUAL FIELD TEST (CVF)
OD_FINDINGS: FULL
OS_FINDINGS: FULL

## 2023-08-07 ENCOUNTER — APPOINTMENT (OUTPATIENT)
Dept: CARDIOLOGY | Facility: CLINIC | Age: 73
End: 2023-08-07
Payer: MEDICARE

## 2023-08-07 VITALS
DIASTOLIC BLOOD PRESSURE: 78 MMHG | SYSTOLIC BLOOD PRESSURE: 134 MMHG | WEIGHT: 122 LBS | HEART RATE: 64 BPM | OXYGEN SATURATION: 99 % | BODY MASS INDEX: 20.33 KG/M2 | HEIGHT: 65 IN

## 2023-08-07 DIAGNOSIS — I25.10 ATHEROSCLEROTIC HEART DISEASE OF NATIVE CORONARY ARTERY W/OUT ANGINA PECTORIS: ICD-10-CM

## 2023-08-07 DIAGNOSIS — I47.1 SUPRAVENTRICULAR TACHYCARDIA: ICD-10-CM

## 2023-08-07 DIAGNOSIS — R00.2 PALPITATIONS: ICD-10-CM

## 2023-08-07 PROCEDURE — 99215 OFFICE O/P EST HI 40 MIN: CPT

## 2023-08-07 PROCEDURE — 93000 ELECTROCARDIOGRAM COMPLETE: CPT

## 2023-08-07 NOTE — END OF VISIT
[Time Spent: ___ minutes] : I have spent [unfilled] minutes of time on the encounter. Bilobed Transposition Flap Text: The defect edges were debeveled with a #15 scalpel blade.  Given the location of the defect and the proximity to free margins a bilobed transposition flap was deemed most appropriate.  Using a sterile surgical marker, an appropriate bilobe flap drawn around the defect.    The area thus outlined was incised deep to adipose tissue with a #15 scalpel blade.  The skin margins were undermined to an appropriate distance in all directions utilizing iris scissors.

## 2023-08-07 NOTE — DISCUSSION/SUMMARY
Mr. Perez is a 30-year-old gentleman 1 week status post I&D of right neck abscess.  It was infected sebaceous cyst that developed into a carbuncle.  No complaints.  He is back to work.  He finished taking his antibiotics.  Wound is open and healing.  No further purulent drainage.  No further cellulitis.  Satisfactory postop visit.  No follow-up necessary.   [FreeTextEntry1] : Vikki is clinically improved since her previous visit here on 6/27/2023, in that she has not experienced recurrence of palpitations, seemingly in response to beta-blocker therapy having been initiated at the time of her previous visit here.  She does not describe having experienced any signs or symptoms to suggest the development of an anginal syndrome, congestive heart failure, or a hemodynamically-compromising arrhythmia.  Her cardiac examination today is remarkable for a faint systolic ejection murmur, unchanged from her previous visit with me.  Her blood pressure reading today is satisfactory.  Her electrocardiogram today reveals sinus rhythm at a rate of 73 bpm with a nonspecific RSR' pattern in leads V1-V2, essentially unchanged from her previous office tracing.  I have instructed Vikki to continue Toprol-XL 25 mg half pill daily for the time being (noting that she was unable to tolerate the full pill secondary to developing fatigue).  As her blood pressure reading today is normal, I have instructed Vikki to also continue losartan 100 mg daily, and to remain off of amlodipine 2.5 mg daily.  I have reviewed the findings of the coronary CT angiogram of 7/6/2023 and the echocardiogram of 7/3/2023 in detail with Vikki today.  Follow-up echocardiography will be planned for July 2024 for reassessment of the degrees of mitral regurgitation and tricuspid regurgitation.  I have reviewed the findings of the blood test report of 5/24/2023 in detail with Vikki today, pointing out to her that this blood test revealed evidence for a mild type IIa dyslipidemia and pre-diabetes.  I have asked Vikki to have a copy of her next blood test results forwarded to my office for my review.  The importance of proper dietary habits, proper weight maintenance, and regular exercise as tolerated was discussed with Vikki today.  I have asked Vikki to call me if she should have any questions or problems pertaining to these matters, and especially if she should experience any concerning symptoms.  I have otherwise asked her to return to the office for follow-up cardiac evaluation and blood pressure reassessment in 6 months, provided she remains clinically stable in the interim. [EKG obtained to assist in diagnosis and management of assessed problem(s)] : EKG obtained to assist in diagnosis and management of assessed problem(s)

## 2023-08-07 NOTE — CARDIOLOGY SUMMARY
[de-identified] : 8/7/23 -sinus rhythm at a rate of 71 bpm.  Nonspecific RSR' pattern in leads V1-V2.

## 2023-08-07 NOTE — PHYSICAL EXAM
[Well Developed] : well developed [No Acute Distress] : no acute distress [Normal Conjunctiva] : normal conjunctiva [No Carotid Bruit] : no carotid bruit [Normal S1, S2] : normal S1, S2 [No Gallop] : no gallop [Murmur] : murmur [Clear Lung Fields] : clear lung fields [No Respiratory Distress] : no respiratory distress  [Soft] : abdomen soft [Non Tender] : non-tender [Normal Gait] : normal gait [No Edema] : no edema [No Rash] : no rash [Moves all extremities] : moves all extremities [Alert and Oriented] : alert and oriented [de-identified] : No JVD is appreciated at a 45 degree angle [de-identified] : I/VI systolic ejection murmur

## 2023-08-07 NOTE — HISTORY OF PRESENT ILLNESS
[FreeTextEntry1] : Ms. Vikki Tellez presented to the office today for follow-up cardiac evaluation.  I last evaluated the patient in the office on 6/27/2023.  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  At the time of her previous visit here on 6/27/2023, Vikki reported having been experiencing intermittent randomly-occurring sensations of an irregular heartbeat, associated with a tachycardic rate at times.  A 2-week extended Holter monitor study initiated on 5/28/2023 revealed numerous episodes of paroxysmal supraventricular tachycardia (fastest 4 beats at 200 bpm, longest 25 seconds at an average rate of 116 bpm), as well as rare supraventricular ectopy and rare ventricular ectopy.  Toprol-XL 25 mg daily was initiated on 6/27/2023, and I subsequently spoke with the patient on the telephone on 7/7/2023, at which time she informed me that her palpitations had resolved after the Toprol-XL was initiated, however, she had developed significant fatigue.  I instructed the patient to reduce the dosage of Toprol-XL to 12.5 mg daily, and with this adjustment, the fatigue improved, and she has not experienced recurrence of palpitations.  At the time of her previous visit here on 6/27/2023, Vikki reported occasionally experiencing chest discomfort associated with dyspnea on exertion in association with walking up an incline, [place].  Coronary CT angiography subsequently performed on 7/6/2023 revealed the total coronary calcium score to be 7 (LAD 5, RCA 2), associated with minimal plaque formation involving the proximal portion of the LAD and the mid-portion of the RCA.  No obstructive coronary artery disease was identified.   Vikki has been stable from a cardiac symptomatic standpoint since her previous visit on 6/27/2023.  Specifically, she does not report having experienced chest discomfort or dyspnea on exertion in association with her activities.  She has not noted orthopnea, paroxysmal nocturnal dyspnea, or lower extremity edema.  She has not experienced recurrence of palpitations.  She has rarely experienced presyncope since her previous visit here, which resolves upon assuming a supine position.  She has not experienced recurrence of vagally-mediated syncope since 2021.  As far as risk factors for coronary artery disease are concerned, Vikki has a history of hypertension, a mild type IIa dyslipidemia, and pre-diabetes.  She denies a history of cigarette smoking.  She does not have a known immediate family history of premature coronary artery disease.  Laboratory studies performed on 5/24/2023 revealed cholesterol 211, triglycerides 82, HDL 72, and calculated .  The liver chemistries were normal.  The BUN and creatinine were 24 and 1.4, respectively.  The potassium level was 5.2.  The glucose level was 101 and the hemoglobin A1c level was 5.9%.  The TSH level is 1.36.  The hemoglobin and hematocrit were 10.7 and 33.2, respectively (a follow-up CBC performed on 6/7/2023 revealed the hemoglobin and hematocrit to be 12.1 and 36.6, respectively.  Coronary CT angiography performed on 7/6/2023 revealed the total coronary calcium score to be 7 (LAD 5, RCA 2).  Minimal plaque formation was identified involving the proximal portion of the LAD and the mid-portion of the RCA.  No obstructive coronary artery disease was identified.  Exercise stress testing performed on 6/25/2020 revealed the patient to exhibit satisfactory exercise tolerance, without the inducement of cardiac symptoms, electrocardiographic evidence of myocardial ischemia, or significant arrhythmias.  Echocardiography most recently performed on 7/3/2023 revealed the right atrium to be mildly dilated.  The remainder of the cardiac chambers were normal in dimension.  Left ventricular wall thickness and wall motion were normal, with an estimated ejection fraction of 60 to 65%.  Normal left ventricular diastolic function was demonstrated.  Mild mitral regurgitation and moderate tricuspid regurgitation were demonstrated, with an estimated right ventricular systolic pressure of 40 mmHg.

## 2023-08-23 ENCOUNTER — APPOINTMENT (OUTPATIENT)
Dept: INTERNAL MEDICINE | Facility: CLINIC | Age: 73
End: 2023-08-23

## 2023-08-25 ENCOUNTER — APPOINTMENT (OUTPATIENT)
Dept: INTERNAL MEDICINE | Facility: CLINIC | Age: 73
End: 2023-08-25
Payer: MEDICARE

## 2023-08-25 VITALS
BODY MASS INDEX: 19.99 KG/M2 | TEMPERATURE: 97.4 F | HEIGHT: 65 IN | HEART RATE: 75 BPM | OXYGEN SATURATION: 98 % | RESPIRATION RATE: 14 BRPM | SYSTOLIC BLOOD PRESSURE: 142 MMHG | WEIGHT: 120 LBS | DIASTOLIC BLOOD PRESSURE: 72 MMHG

## 2023-08-25 VITALS — DIASTOLIC BLOOD PRESSURE: 70 MMHG | SYSTOLIC BLOOD PRESSURE: 130 MMHG

## 2023-08-25 PROCEDURE — 99214 OFFICE O/P EST MOD 30 MIN: CPT

## 2023-08-25 NOTE — HISTORY OF PRESENT ILLNESS
[FreeTextEntry1] : follow up  [de-identified] : LAINE MOHAN is a 73 year old F who presents today for follow up for renewal of medications for HTN and anxiety. Pt complains about pain in her right shoulder and left foot. Pt recently saw Cardiologist. for palpitations

## 2023-08-25 NOTE — PLAN
[FreeTextEntry1] : continue medications for HTN and anxiety follow up with Cardiologist  advised to see Orthopedist

## 2023-08-25 NOTE — HEALTH RISK ASSESSMENT
[Never (0 pts)] : Never (0 points) [No] : In the past 12 months have you used drugs other than those required for medical reasons? No [No falls in past year] : Patient reported no falls in the past year [0] : 2) Feeling down, depressed, or hopeless: Not at all (0) [PHQ-2 Negative - No further assessment needed] : PHQ-2 Negative - No further assessment needed [Never] : Never [[id number]] : 0

## 2023-08-25 NOTE — REVIEW OF SYSTEMS
[Joint Pain] : joint pain [Muscle Pain] : muscle pain [Negative] : Heme/Lymph [FreeTextEntry9] : shoulder pain and left foot pain

## 2023-08-25 NOTE — END OF VISIT
[FreeTextEntry3] : "I, Kendrick Riddle, personally scribed the services dictated to me by Dr. Brendon Rouse MD in this documentation on 08/25/2023 "   "I Dr. Brendon Rouse MD, personally performed the services described in this documentation on 08/25/2023 for the patient as scribed by Kendrick Riddle in my presence. I have reviewed and verified that all the information is accurate and true."

## 2023-11-14 ENCOUNTER — APPOINTMENT (OUTPATIENT)
Dept: INTERNAL MEDICINE | Facility: CLINIC | Age: 73
End: 2023-11-14
Payer: MEDICARE

## 2023-11-14 VITALS
BODY MASS INDEX: 19.99 KG/M2 | HEIGHT: 65 IN | HEART RATE: 67 BPM | SYSTOLIC BLOOD PRESSURE: 170 MMHG | WEIGHT: 120 LBS | DIASTOLIC BLOOD PRESSURE: 79 MMHG

## 2023-11-14 VITALS — SYSTOLIC BLOOD PRESSURE: 136 MMHG | DIASTOLIC BLOOD PRESSURE: 74 MMHG

## 2023-11-14 VITALS — DIASTOLIC BLOOD PRESSURE: 74 MMHG | SYSTOLIC BLOOD PRESSURE: 136 MMHG

## 2023-11-14 PROCEDURE — 99214 OFFICE O/P EST MOD 30 MIN: CPT | Mod: 25

## 2023-11-14 PROCEDURE — 90662 IIV NO PRSV INCREASED AG IM: CPT

## 2023-11-14 PROCEDURE — G0008: CPT

## 2023-11-14 RX ORDER — METOPROLOL SUCCINATE 25 MG/1
25 TABLET, EXTENDED RELEASE ORAL DAILY
Qty: 45 | Refills: 3 | Status: ACTIVE | COMMUNITY
Start: 2023-06-27 | End: 1900-01-01

## 2024-02-02 ENCOUNTER — OFFICE (OUTPATIENT)
Dept: URBAN - METROPOLITAN AREA CLINIC 12 | Facility: CLINIC | Age: 74
Setting detail: OPHTHALMOLOGY
End: 2024-02-02
Payer: MEDICARE

## 2024-02-02 DIAGNOSIS — H00.11: ICD-10-CM

## 2024-02-02 PROCEDURE — 99213 OFFICE O/P EST LOW 20 MIN: CPT | Performed by: OPTOMETRIST

## 2024-02-02 ASSESSMENT — REFRACTION_MANIFEST
OD_SPHERE: PLANO
OU_VA: 20
OD_CYLINDER: SPHERE
OS_ADD: +1.50
OS_CYLINDER: SPHERE
OD_AXIS: 000
OD_VA2: 20
OS_SPHERE: PLANO
OS_VA1: 20/20-2
OS_AXIS: 000
OD_VA1: 20/30
OD_ADD: +1.50

## 2024-02-02 ASSESSMENT — SUPERFICIAL PUNCTATE KERATITIS (SPK)
OS_SPK: T
OD_SPK: T

## 2024-02-02 ASSESSMENT — REFRACTION_CURRENTRX
OS_AXIS: 084
OS_ADD: +2.75
OD_AXIS: 077
OS_VPRISM_DIRECTION: BF
OD_ADD: +2.75
OD_SPHERE: +3.50
OD_OVR_VA: 20/
OD_CYLINDER: -2.00
OS_SPHERE: +1.75
OS_OVR_VA: 20/
OD_VPRISM_DIRECTION: BF
OS_CYLINDER: -0.75

## 2024-02-02 ASSESSMENT — CONFRONTATIONAL VISUAL FIELD TEST (CVF)
OD_FINDINGS: FULL
OS_FINDINGS: FULL

## 2024-02-02 ASSESSMENT — REFRACTION_AUTOREFRACTION
OD_AXIS: 0
OS_CYLINDER: -0.75
OD_CYLINDER: SPHERE
OD_SPHERE: -0.25
OS_SPHERE: +0.25
OS_AXIS: 113

## 2024-02-02 ASSESSMENT — SPHEQUIV_DERIVED: OS_SPHEQUIV: -0.125

## 2024-02-05 ENCOUNTER — OFFICE (OUTPATIENT)
Dept: URBAN - METROPOLITAN AREA CLINIC 100 | Facility: CLINIC | Age: 74
Setting detail: OPHTHALMOLOGY
End: 2024-02-05
Payer: MEDICARE

## 2024-02-05 ENCOUNTER — RX ONLY (RX ONLY)
Age: 74
End: 2024-02-05

## 2024-02-05 DIAGNOSIS — H00.11: ICD-10-CM

## 2024-02-05 PROCEDURE — 67800 REMOVE EYELID LESION: CPT | Mod: RT | Performed by: OPHTHALMOLOGY

## 2024-02-05 PROCEDURE — 92285 EXTERNAL OCULAR PHOTOGRAPHY: CPT | Performed by: OPHTHALMOLOGY

## 2024-02-05 ASSESSMENT — REFRACTION_AUTOREFRACTION
OS_AXIS: 113
OD_CYLINDER: SPHERE
OD_SPHERE: -0.25
OS_CYLINDER: -0.75
OS_SPHERE: +0.25
OD_AXIS: 0

## 2024-02-05 ASSESSMENT — SUPERFICIAL PUNCTATE KERATITIS (SPK)
OS_SPK: T
OD_SPK: T

## 2024-02-05 ASSESSMENT — SPHEQUIV_DERIVED: OS_SPHEQUIV: -0.125

## 2024-02-05 ASSESSMENT — CONFRONTATIONAL VISUAL FIELD TEST (CVF)
OD_FINDINGS: FULL
OS_FINDINGS: FULL

## 2024-02-07 ENCOUNTER — APPOINTMENT (OUTPATIENT)
Dept: INTERNAL MEDICINE | Facility: CLINIC | Age: 74
End: 2024-02-07
Payer: MEDICARE

## 2024-02-07 VITALS
SYSTOLIC BLOOD PRESSURE: 132 MMHG | HEIGHT: 65 IN | BODY MASS INDEX: 21.16 KG/M2 | WEIGHT: 127 LBS | OXYGEN SATURATION: 98 % | TEMPERATURE: 98.3 F | DIASTOLIC BLOOD PRESSURE: 84 MMHG | HEART RATE: 76 BPM | RESPIRATION RATE: 16 BRPM

## 2024-02-07 DIAGNOSIS — I10 ESSENTIAL (PRIMARY) HYPERTENSION: ICD-10-CM

## 2024-02-07 DIAGNOSIS — J01.00 ACUTE MAXILLARY SINUSITIS, UNSPECIFIED: ICD-10-CM

## 2024-02-07 DIAGNOSIS — F41.9 ANXIETY DISORDER, UNSPECIFIED: ICD-10-CM

## 2024-02-07 PROCEDURE — G2211 COMPLEX E/M VISIT ADD ON: CPT

## 2024-02-07 PROCEDURE — 99214 OFFICE O/P EST MOD 30 MIN: CPT

## 2024-02-07 RX ORDER — AZITHROMYCIN 250 MG/1
250 TABLET, FILM COATED ORAL
Qty: 1 | Refills: 0 | Status: ACTIVE | COMMUNITY
Start: 2024-02-07 | End: 1900-01-01

## 2024-02-07 RX ORDER — PREDNISONE 20 MG/1
20 TABLET ORAL
Qty: 5 | Refills: 0 | Status: ACTIVE | COMMUNITY
Start: 2024-02-07 | End: 1900-01-01

## 2024-02-07 NOTE — END OF VISIT
[FreeTextEntry3] : "I, Higinio Cabrera, personally scribed the services dictated to me by Dr. Brendon Rouse MD in this documentation on 02/07/2024"   "I Dr. Brendon Rouse MD, personally performed the services described in this documentation on 02/07/2024 for the patient as scribed by Higinio Cabrera in my presence. I have reviewed and verified that all the information is accurate and true."

## 2024-02-07 NOTE — HISTORY OF PRESENT ILLNESS
[FreeTextEntry1] : follow up  [de-identified] : LAINE MOHAN is a 73 year old F who presents today for follow up for HTN. Pt has a hx of HLD, anxiety and prediabetes. Pt complains of sinus congestion, postnasal drip and a headache that's persisted for about two months. Pt also reports having a stye on her eye that she recently had lanced by the ophthalmologist. Pt has not been using any nasal sprays.  has anxiety takes Xanax

## 2024-02-07 NOTE — HEALTH RISK ASSESSMENT
[Never (0 pts)] : Never (0 points) [No] : In the past 12 months have you used drugs other than those required for medical reasons? No [No falls in past year] : Patient reported no falls in the past year [Little interest or pleasure doing things] : 1) Little interest or pleasure doing things [Feeling down, depressed, or hopeless] : 2) Feeling down, depressed, or hopeless [0] : 2) Feeling down, depressed, or hopeless: Not at all (0) [PHQ-2 Negative - No further assessment needed] : PHQ-2 Negative - No further assessment needed [Never] : Never [[id number]] : 0

## 2024-02-22 ENCOUNTER — OFFICE (OUTPATIENT)
Dept: URBAN - METROPOLITAN AREA CLINIC 100 | Facility: CLINIC | Age: 74
Setting detail: OPHTHALMOLOGY
End: 2024-02-22
Payer: MEDICARE

## 2024-02-22 DIAGNOSIS — H00.11: ICD-10-CM

## 2024-02-22 DIAGNOSIS — H01.004: ICD-10-CM

## 2024-02-22 DIAGNOSIS — H01.001: ICD-10-CM

## 2024-02-22 PROCEDURE — 99213 OFFICE O/P EST LOW 20 MIN: CPT | Performed by: OPHTHALMOLOGY

## 2024-02-22 ASSESSMENT — CONFRONTATIONAL VISUAL FIELD TEST (CVF)
OD_FINDINGS: FULL
OS_FINDINGS: FULL

## 2024-02-22 ASSESSMENT — SUPERFICIAL PUNCTATE KERATITIS (SPK)
OD_SPK: T
OS_SPK: T

## 2024-02-22 ASSESSMENT — REFRACTION_AUTOREFRACTION
OS_AXIS: 113
OD_CYLINDER: SPHERE
OS_CYLINDER: -0.75
OS_SPHERE: +0.25
OD_SPHERE: -0.25
OD_AXIS: 0

## 2024-02-22 ASSESSMENT — SPHEQUIV_DERIVED: OS_SPHEQUIV: -0.125

## 2024-02-24 ENCOUNTER — RX RENEWAL (OUTPATIENT)
Age: 74
End: 2024-02-24

## 2024-02-26 ENCOUNTER — APPOINTMENT (OUTPATIENT)
Dept: CARDIOLOGY | Facility: CLINIC | Age: 74
End: 2024-02-26

## 2024-03-13 NOTE — ED ADULT NURSE NOTE - NS ED NURSE RECORD ANOTHER HT AND WT
Detail Level: Detailed Patient Specific Counseling (Will Not Stick From Patient To Patient): -\\nPt notes minor burning and stinging when she applies urea. Pt says she exfoliates with a loofah in the shower which LC advises against. Patient Specific Counseling (Will Not Stick From Patient To Patient): -\\nPt noted stinging from Amlactin. LC notes that alternative rx to Amlactin can be even more irritating. LC recommends pt email the ingredients of dermoxido so that she can look at the active ingredients. Medical Necessity Information: LCD Guidelines vary from payer to payer. Please check with your payer's policy to determine medical necessity. Medical Necessity Clause: Botulinum toxin hyperhidrosis therapy is medically necessary because Yes

## 2024-04-23 ENCOUNTER — APPOINTMENT (OUTPATIENT)
Dept: INTERNAL MEDICINE | Facility: CLINIC | Age: 74
End: 2024-04-23
Payer: MEDICARE

## 2024-04-23 VITALS
OXYGEN SATURATION: 96 % | SYSTOLIC BLOOD PRESSURE: 130 MMHG | WEIGHT: 130 LBS | HEIGHT: 65 IN | RESPIRATION RATE: 16 BRPM | DIASTOLIC BLOOD PRESSURE: 84 MMHG | BODY MASS INDEX: 21.66 KG/M2 | TEMPERATURE: 98 F | HEART RATE: 86 BPM

## 2024-04-23 DIAGNOSIS — M25.50 PAIN IN UNSPECIFIED JOINT: ICD-10-CM

## 2024-04-23 DIAGNOSIS — D64.9 ANEMIA, UNSPECIFIED: ICD-10-CM

## 2024-04-23 DIAGNOSIS — E78.5 HYPERLIPIDEMIA, UNSPECIFIED: ICD-10-CM

## 2024-04-23 PROCEDURE — 99214 OFFICE O/P EST MOD 30 MIN: CPT

## 2024-04-23 PROCEDURE — G2211 COMPLEX E/M VISIT ADD ON: CPT

## 2024-04-23 NOTE — PLAN
[FreeTextEntry1] : continue medications Losartan Metoprolol Alprazolam Further instructions pending lab results  chronic medical conditions HTN HLD prediabetes

## 2024-04-23 NOTE — END OF VISIT
[FreeTextEntry3] : "I, Higinio Cabrera, personally scribed the services dictated to me by Dr. Brendon Rouse MD in this documentation on 04/23/2024"   "I Dr. Brendon Rouse MD, personally performed the services described in this documentation on 04/23/2024 for the patient as scribed by Higinio Cabrera in my presence. I have reviewed and verified that all the information is accurate and true."

## 2024-04-23 NOTE — HISTORY OF PRESENT ILLNESS
[FreeTextEntry1] : follow up  [de-identified] : LAINE MOHAN is a 73-year-old F who presents today for follow up for HTN. Pt has a hx of HLD, prediabetes, arthritis and anxiety. Pt reports recent weight gain. Pt complains of right hip pain that makes it difficult to sleep.

## 2024-04-23 NOTE — HEALTH RISK ASSESSMENT
[Never (0 pts)] : Never (0 points) [No] : In the past 12 months have you used drugs other than those required for medical reasons? No [No falls in past year] : Patient reported no falls in the past year [Little interest or pleasure doing things] : 1) Little interest or pleasure doing things [Feeling down, depressed, or hopeless] : 2) Feeling down, depressed, or hopeless [0] : 2) Feeling down, depressed, or hopeless: Not at all (0) [PHQ-2 Negative - No further assessment needed] : PHQ-2 Negative - No further assessment needed [Never] : Never [[id number]] : 0

## 2024-04-25 DIAGNOSIS — E87.5 HYPERKALEMIA: ICD-10-CM

## 2024-04-25 DIAGNOSIS — N91.2 AMENORRHEA, UNSPECIFIED: ICD-10-CM

## 2024-04-25 LAB
25(OH)D3 SERPL-MCNC: 51.1 NG/ML
ALBUMIN SERPL ELPH-MCNC: 4.5 G/DL
ALP BLD-CCNC: 82 U/L
ALT SERPL-CCNC: 13 U/L
ANION GAP SERPL CALC-SCNC: 12 MMOL/L
AST SERPL-CCNC: 21 U/L
BASOPHILS # BLD AUTO: 0.1 K/UL
BASOPHILS NFR BLD AUTO: 1.1 %
BILIRUB SERPL-MCNC: 0.2 MG/DL
BUN SERPL-MCNC: 26 MG/DL
CALCIUM SERPL-MCNC: 9.7 MG/DL
CHLORIDE SERPL-SCNC: 100 MMOL/L
CHOLEST SERPL-MCNC: 218 MG/DL
CK SERPL-CCNC: 158 U/L
CO2 SERPL-SCNC: 22 MMOL/L
CREAT SERPL-MCNC: 1.49 MG/DL
EGFR: 37 ML/MIN/1.73M2
EOSINOPHIL # BLD AUTO: 0.44 K/UL
EOSINOPHIL NFR BLD AUTO: 4.7 %
ESTIMATED AVERAGE GLUCOSE: 120 MG/DL
GLUCOSE SERPL-MCNC: 89 MG/DL
HBA1C MFR BLD HPLC: 5.8 %
HCT VFR BLD CALC: 33.1 %
HDLC SERPL-MCNC: 69 MG/DL
HGB BLD-MCNC: 10.9 G/DL
IMM GRANULOCYTES NFR BLD AUTO: 0.2 %
LDLC SERPL CALC-MCNC: 130 MG/DL
LYMPHOCYTES # BLD AUTO: 1.99 K/UL
LYMPHOCYTES NFR BLD AUTO: 21.4 %
MAN DIFF?: NORMAL
MCHC RBC-ENTMCNC: 32.2 PG
MCHC RBC-ENTMCNC: 32.9 GM/DL
MCV RBC AUTO: 97.9 FL
MONOCYTES # BLD AUTO: 0.98 K/UL
MONOCYTES NFR BLD AUTO: 10.5 %
NEUTROPHILS # BLD AUTO: 5.78 K/UL
NEUTROPHILS NFR BLD AUTO: 62.1 %
NONHDLC SERPL-MCNC: 149 MG/DL
PLATELET # BLD AUTO: 235 K/UL
POTASSIUM SERPL-SCNC: 5.7 MMOL/L
PROT SERPL-MCNC: 7.2 G/DL
RBC # BLD: 3.38 M/UL
RBC # FLD: 11.6 %
SODIUM SERPL-SCNC: 134 MMOL/L
TRIGL SERPL-MCNC: 106 MG/DL
TSH SERPL-ACNC: 1.29 UIU/ML
WBC # FLD AUTO: 9.31 K/UL

## 2024-04-25 RX ORDER — ALPRAZOLAM 0.25 MG/1
0.25 TABLET ORAL TWICE DAILY
Qty: 30 | Refills: 0 | Status: ACTIVE | COMMUNITY
Start: 2022-07-15 | End: 1900-01-01

## 2024-04-25 RX ORDER — LOSARTAN POTASSIUM 100 MG/1
100 TABLET, FILM COATED ORAL
Qty: 90 | Refills: 0 | Status: ACTIVE | COMMUNITY
Start: 2018-03-15 | End: 1900-01-01

## 2024-05-09 LAB
ANION GAP SERPL CALC-SCNC: 11 MMOL/L
BASOPHILS # BLD AUTO: 0.09 K/UL
BASOPHILS NFR BLD AUTO: 1.7 %
BUN SERPL-MCNC: 22 MG/DL
CALCIUM SERPL-MCNC: 9.6 MG/DL
CHLORIDE SERPL-SCNC: 99 MMOL/L
CO2 SERPL-SCNC: 23 MMOL/L
CREAT SERPL-MCNC: 1.46 MG/DL
EGFR: 38 ML/MIN/1.73M2
EOSINOPHIL # BLD AUTO: 0.46 K/UL
EOSINOPHIL NFR BLD AUTO: 8.4 %
FOLATE SERPL-MCNC: 15.1 NG/ML
GLUCOSE SERPL-MCNC: 93 MG/DL
HCT VFR BLD CALC: 37 %
HGB BLD-MCNC: 11.4 G/DL
IMM GRANULOCYTES NFR BLD AUTO: 0.2 %
IRON SATN MFR SERPL: 40 %
IRON SERPL-MCNC: 108 UG/DL
LYMPHOCYTES # BLD AUTO: 1.83 K/UL
LYMPHOCYTES NFR BLD AUTO: 33.6 %
MAN DIFF?: NORMAL
MCHC RBC-ENTMCNC: 30.8 GM/DL
MCHC RBC-ENTMCNC: 32.1 PG
MCV RBC AUTO: 104.2 FL
MONOCYTES # BLD AUTO: 0.56 K/UL
MONOCYTES NFR BLD AUTO: 10.3 %
NEUTROPHILS # BLD AUTO: 2.5 K/UL
NEUTROPHILS NFR BLD AUTO: 45.8 %
PLATELET # BLD AUTO: 214 K/UL
POTASSIUM SERPL-SCNC: 5.5 MMOL/L
RBC # BLD: 3.55 M/UL
RBC # FLD: 11.9 %
SODIUM SERPL-SCNC: 134 MMOL/L
TIBC SERPL-MCNC: 268 UG/DL
UIBC SERPL-MCNC: 160 UG/DL
VIT B12 SERPL-MCNC: 384 PG/ML
WBC # FLD AUTO: 5.45 K/UL

## 2024-08-16 ENCOUNTER — RX RENEWAL (OUTPATIENT)
Age: 74
End: 2024-08-16

## 2024-09-27 RX ORDER — CYCLOBENZAPRINE HYDROCHLORIDE 10 MG/1
10 TABLET, FILM COATED ORAL
Qty: 10 | Refills: 1 | Status: ACTIVE | COMMUNITY
Start: 2024-09-27 | End: 1900-01-01

## 2024-10-01 ENCOUNTER — NON-APPOINTMENT (OUTPATIENT)
Age: 74
End: 2024-10-01

## 2024-10-02 ENCOUNTER — APPOINTMENT (OUTPATIENT)
Dept: ORTHOPEDIC SURGERY | Facility: CLINIC | Age: 74
End: 2024-10-02

## 2024-10-02 ENCOUNTER — APPOINTMENT (OUTPATIENT)
Dept: INTERNAL MEDICINE | Facility: CLINIC | Age: 74
End: 2024-10-02
Payer: MEDICARE

## 2024-10-02 VITALS
BODY MASS INDEX: 21.99 KG/M2 | DIASTOLIC BLOOD PRESSURE: 72 MMHG | RESPIRATION RATE: 14 BRPM | HEART RATE: 87 BPM | TEMPERATURE: 98 F | HEIGHT: 65 IN | WEIGHT: 132 LBS | SYSTOLIC BLOOD PRESSURE: 134 MMHG | OXYGEN SATURATION: 96 %

## 2024-10-02 VITALS
BODY MASS INDEX: 21.73 KG/M2 | WEIGHT: 132 LBS | HEIGHT: 65.5 IN | DIASTOLIC BLOOD PRESSURE: 95 MMHG | HEART RATE: 83 BPM | SYSTOLIC BLOOD PRESSURE: 151 MMHG

## 2024-10-02 DIAGNOSIS — S32.9XXA FRACTURE OF UNSPECIFIED PARTS OF LUMBOSACRAL SPINE AND PELVIS, INITIAL ENCOUNTER FOR CLOSED FRACTURE: ICD-10-CM

## 2024-10-02 DIAGNOSIS — K59.00 CONSTIPATION, UNSPECIFIED: ICD-10-CM

## 2024-10-02 DIAGNOSIS — I10 ESSENTIAL (PRIMARY) HYPERTENSION: ICD-10-CM

## 2024-10-02 DIAGNOSIS — S32.810A MULTIPLE FRACTURES OF PELVIS WITH STABLE DISRUPTION OF PELVIC RING, INITIAL ENCOUNTER FOR CLOSED FRACTURE: ICD-10-CM

## 2024-10-02 PROCEDURE — 27197 CLSD TX PELVIC RING FX: CPT

## 2024-10-02 PROCEDURE — 99203 OFFICE O/P NEW LOW 30 MIN: CPT | Mod: 57

## 2024-10-02 PROCEDURE — 99496 TRANSJ CARE MGMT HIGH F2F 7D: CPT

## 2024-10-02 PROCEDURE — 72190 X-RAY EXAM OF PELVIS: CPT

## 2024-10-02 RX ORDER — POLYETHYLENE GLYCOL 3350 17 G/17G
17 POWDER, FOR SOLUTION ORAL
Qty: 510 | Refills: 2 | Status: ACTIVE | COMMUNITY
Start: 2024-10-02 | End: 1900-01-01

## 2024-10-02 RX ORDER — CALCITONIN SALMON 200 [IU]/1
200 SPRAY, METERED NASAL DAILY
Qty: 1 | Refills: 2 | Status: ACTIVE | COMMUNITY
Start: 2024-10-02 | End: 1900-01-01

## 2024-10-02 NOTE — PHYSICAL EXAM
[de-identified] : Physical Exam: General: Well appearing, no acute distress, A&O Neurologic: A&Ox3, No focal deficits Head: NCAT without abrasions, lacerations, or ecchymosis to head, face, or scalp Respiratory: Equal chest wall expansion bilaterally, no accessory muscle use Lymphatic: No lymphadenopathy palpated Skin: Warm and dry Psychiatric: Normal mood and affect  SILT s/s/sp/dp/t Fires EHL/FHL/GS/TA 2+ DP/PT pulse brisk capillary refill [de-identified] :  3 views of the pelvis were obtained today which show a minimally displaced fracture of the left anterior pelvic ring

## 2024-10-02 NOTE — PHYSICAL EXAM
[No Acute Distress] : no acute distress [Well Nourished] : well nourished [Well Developed] : well developed [Well-Appearing] : well-appearing [Normal Voice/Communication] : normal voice/communication [Normal Sclera/Conjunctiva] : normal sclera/conjunctiva [PERRL] : pupils equal round and reactive to light [EOMI] : extraocular movements intact [Normal Outer Ear/Nose] : the outer ears and nose were normal in appearance [Normal Oropharynx] : the oropharynx was normal [Normal TMs] : both tympanic membranes were normal [No JVD] : no jugular venous distention [No Lymphadenopathy] : no lymphadenopathy [Supple] : supple [Thyroid Normal, No Nodules] : the thyroid was normal and there were no nodules present [No Respiratory Distress] : no respiratory distress  [No Accessory Muscle Use] : no accessory muscle use [Clear to Auscultation] : lungs were clear to auscultation bilaterally [Normal Rate] : normal rate  [Regular Rhythm] : with a regular rhythm [Normal S1, S2] : normal S1 and S2 [No Murmur] : no murmur heard [No Carotid Bruits] : no carotid bruits [No Abdominal Bruit] : a ~M bruit was not heard ~T in the abdomen [No Varicosities] : no varicosities [Pedal Pulses Present] : the pedal pulses are present [No Edema] : there was no peripheral edema [No Palpable Aorta] : no palpable aorta [No Extremity Clubbing/Cyanosis] : no extremity clubbing/cyanosis [Soft] : abdomen soft [Non Tender] : non-tender [Non-distended] : non-distended [No Masses] : no abdominal mass palpated [No HSM] : no HSM [Normal Bowel Sounds] : normal bowel sounds [Normal Supraclavicular Nodes] : no supraclavicular lymphadenopathy [Normal Posterior Cervical Nodes] : no posterior cervical lymphadenopathy [Normal Anterior Cervical Nodes] : no anterior cervical lymphadenopathy [No CVA Tenderness] : no CVA  tenderness [No Spinal Tenderness] : no spinal tenderness [No Joint Swelling] : no joint swelling [Grossly Normal Strength/Tone] : grossly normal strength/tone [No Rash] : no rash [Coordination Grossly Intact] : coordination grossly intact [Deep Tendon Reflexes (DTR)] : deep tendon reflexes were 2+ and symmetric [Speech Grossly Normal] : speech grossly normal [Memory Grossly Normal] : memory grossly normal [Normal Affect] : the affect was normal [Alert and Oriented x3] : oriented to person, place, and time [Normal Mood] : the mood was normal [Normal Insight/Judgement] : insight and judgment were intact [de-identified] : In Wheelchair

## 2024-10-02 NOTE — REVIEW OF SYSTEMS
[Joint Pain] : joint pain [Insomnia] : insomnia [Negative] : Heme/Lymph [Constipation] : constipation

## 2024-10-02 NOTE — HISTORY OF PRESENT ILLNESS
[de-identified] : DOI: 09/24/2024  Patient is a pleasant 74-year-old female presents with her daughter today for evaluation of left groin pain.  She was walking her dogs, an Aussie and a corgi, when she suffered a fall when they saw another dog and pulled her sideways.  She was seen in [facility] and comes in today for evaluation.  She has been using a walker and a wheelchair for pain relief.  The patient states the pain is made worse with activity and relieved with rest.  Aching, 4 out of 10 [Bending] : worsened by bending [Lifting] : worsened by lifting [Weight Bearing] : worsened by weight bearing [Recumbency] : relieved by recumbency [Rest] : relieved by rest

## 2024-10-02 NOTE — HEALTH RISK ASSESSMENT
[Never (0 pts)] : Never (0 points) [No] : In the past 12 months have you used drugs other than those required for medical reasons? No [Any fall with injury in past year] : Patient reported fall with injury in the past year [Assistive Device] : Patient uses an assistive device [Little interest or pleasure doing things] : 1) Little interest or pleasure doing things [Feeling down, depressed, or hopeless] : 2) Feeling down, depressed, or hopeless [0] : 2) Feeling down, depressed, or hopeless: Not at all (0) [Never] : Never [de-identified] : Wheelchair  [[id number]] : 0

## 2024-10-02 NOTE — HISTORY OF PRESENT ILLNESS
[Post-hospitalization from ___ Hospital] : Post-hospitalization from [unfilled] Hospital [Admitted on: ___] : The patient was admitted on [unfilled] [Discharged on ___] : discharged on [unfilled] [Discharge Summary] : discharge summary [Pertinent Labs] : pertinent labs [Discharge Med List] : discharge medication list [Med Reconciliation] : medication reconciliation has been completed [FreeTextEntry2] : LAINE MOHAN is a 74 year old F who presents today for hospital follow up. Pt has a hx of HTN, HLD, osteoarthritis and prediabetes. Pt presented to the emergency department at [facility] due to hip pain after experiencing a fall while walking her dogs. Pt had a Hip with Pelvis X-ray done that revealed a fracture in her pelvis. Pt had her Losartan decreased to 50mg in the hospital. Pt was discharged from the hospital with pain medication. Pt complains of pain in her groin that radiates to her buttocks, insomnia for the past week and difficulty ambulating. Pt states her joint pain being worse on the left side of her body. Pt also complains of constipation. Pt has been taking Colace with no relief of her symptoms.

## 2024-10-02 NOTE — PLAN
[FreeTextEntry1] : continue medications Losartan Metoprolol Alprazolam chronic medical conditions HTN HLD prediabetes  Further instructions pending lab results  Renewed Xanax Start Miralax for constipation  To see Orthopedist today

## 2024-10-02 NOTE — DISCUSSION/SUMMARY
[de-identified] : 74-year-old female with pelvic ring fracture.  We discussed that these tend to heal well but can be painful for some time.  Physical therapy can be helpful for active assisted range of motion, passive range of motion, weightbearing as tolerated, gait training, strengthening, no restrictions.  Modalities as indicated.  We also recommended calcitonin which can be an adjunct of pain relief agent.  Conservative measures of treatment include rest until asymptomatic, activity avoidance, NSAID's PRN, acetaminophen, application to ice to the area 2-3x daily for 20 minutes, with gradual return to activities.  Osteopenia and osteoporosis are significant risk factors for fragility fractures. Given the type of fracture that has occurred, I would highly recommend that the patient followup with their primary care physician to discuss treatment for low bone mineral density. We discussed the benefits of these medications frequently far outweigh the small risks. Their primary care physician can call the office with any specific questions or concerns.  The patient was given the opportunity to ask questions and all questions were answered to their satisfaction. No orthopedic trauma intervention is required but we will help facilitate future care if necessary. The patient may follow up as needed.  Feliciano Huffman MD Orthopaedic Trauma Surgeon [facility]  Orthopaedic Trauma, [facility]

## 2024-10-02 NOTE — END OF VISIT
[FreeTextEntry3] : "I, Higinio Cabrera, personally scribed the services dictated to me by Dr. Brendon Rouse MD in this documentation on 10/02/2024"   "I Dr. Brendon Rouse MD, personally performed the services described in this documentation on 10/02/2024 for the patient as scribed by Higinio Cabrera in my presence. I have reviewed and verified that all the information is accurate and true."

## 2024-10-03 DIAGNOSIS — D50.8 OTHER IRON DEFICIENCY ANEMIAS: ICD-10-CM

## 2024-10-03 LAB
ALBUMIN SERPL ELPH-MCNC: 4.4 G/DL
ALP BLD-CCNC: 162 U/L
ALT SERPL-CCNC: 43 U/L
ANION GAP SERPL CALC-SCNC: 14 MMOL/L
AST SERPL-CCNC: 40 U/L
BASOPHILS # BLD AUTO: 0.07 K/UL
BASOPHILS NFR BLD AUTO: 0.9 %
BILIRUB SERPL-MCNC: 0.5 MG/DL
BUN SERPL-MCNC: 25 MG/DL
CALCIUM SERPL-MCNC: 9.5 MG/DL
CHLORIDE SERPL-SCNC: 96 MMOL/L
CO2 SERPL-SCNC: 23 MMOL/L
CREAT SERPL-MCNC: 1.4 MG/DL
EGFR: 40 ML/MIN/1.73M2
EOSINOPHIL # BLD AUTO: 0.32 K/UL
EOSINOPHIL NFR BLD AUTO: 4.3 %
FOLATE SERPL-MCNC: 12.6 NG/ML
GLUCOSE SERPL-MCNC: 95 MG/DL
HCT VFR BLD CALC: 27.8 %
HGB BLD-MCNC: 9.1 G/DL
IMM GRANULOCYTES NFR BLD AUTO: 0.4 %
IRON SATN MFR SERPL: 22 %
IRON SERPL-MCNC: 53 UG/DL
LYMPHOCYTES # BLD AUTO: 1.5 K/UL
LYMPHOCYTES NFR BLD AUTO: 20.1 %
MAN DIFF?: NORMAL
MCHC RBC-ENTMCNC: 31.9 PG
MCHC RBC-ENTMCNC: 32.7 GM/DL
MCV RBC AUTO: 97.5 FL
MONOCYTES # BLD AUTO: 0.76 K/UL
MONOCYTES NFR BLD AUTO: 10.2 %
NEUTROPHILS # BLD AUTO: 4.79 K/UL
NEUTROPHILS NFR BLD AUTO: 64.1 %
PLATELET # BLD AUTO: 259 K/UL
POTASSIUM SERPL-SCNC: 5.4 MMOL/L
PROT SERPL-MCNC: 7.2 G/DL
RBC # BLD: 2.85 M/UL
RBC # FLD: 12.5 %
SODIUM SERPL-SCNC: 134 MMOL/L
TIBC SERPL-MCNC: 247 UG/DL
UIBC SERPL-MCNC: 193 UG/DL
VIT B12 SERPL-MCNC: 448 PG/ML
WBC # FLD AUTO: 7.47 K/UL

## 2024-10-03 RX ORDER — CHLORHEXIDINE GLUCONATE 4 %
325 (65 FE) LIQUID (ML) TOPICAL DAILY
Qty: 30 | Refills: 1 | Status: ACTIVE | COMMUNITY
Start: 2024-10-03 | End: 1900-01-01

## 2024-11-04 ENCOUNTER — APPOINTMENT (OUTPATIENT)
Dept: INTERNAL MEDICINE | Facility: CLINIC | Age: 74
End: 2024-11-04
Payer: MEDICARE

## 2024-11-04 VITALS
RESPIRATION RATE: 16 BRPM | TEMPERATURE: 98.1 F | DIASTOLIC BLOOD PRESSURE: 82 MMHG | SYSTOLIC BLOOD PRESSURE: 164 MMHG | BODY MASS INDEX: 21.4 KG/M2 | HEART RATE: 84 BPM | OXYGEN SATURATION: 99 % | HEIGHT: 65.5 IN | WEIGHT: 130 LBS

## 2024-11-04 VITALS — DIASTOLIC BLOOD PRESSURE: 80 MMHG | SYSTOLIC BLOOD PRESSURE: 144 MMHG

## 2024-11-04 DIAGNOSIS — N28.9 DISORDER OF KIDNEY AND URETER, UNSPECIFIED: ICD-10-CM

## 2024-11-04 DIAGNOSIS — I10 ESSENTIAL (PRIMARY) HYPERTENSION: ICD-10-CM

## 2024-11-04 DIAGNOSIS — R73.09 OTHER ABNORMAL GLUCOSE: ICD-10-CM

## 2024-11-04 PROCEDURE — 36415 COLL VENOUS BLD VENIPUNCTURE: CPT

## 2024-11-04 PROCEDURE — 99214 OFFICE O/P EST MOD 30 MIN: CPT | Mod: 25

## 2024-11-04 PROCEDURE — G0008: CPT

## 2024-11-04 PROCEDURE — 90662 IIV NO PRSV INCREASED AG IM: CPT

## 2024-11-14 LAB
ALBUMIN SERPL ELPH-MCNC: 4.4 G/DL
ALP BLD-CCNC: 170 U/L
ALT SERPL-CCNC: 27 U/L
ANION GAP SERPL CALC-SCNC: 13 MMOL/L
AST SERPL-CCNC: 24 U/L
BASOPHILS # BLD AUTO: 0.1 K/UL
BASOPHILS NFR BLD AUTO: 1 %
BILIRUB SERPL-MCNC: 0.3 MG/DL
BUN SERPL-MCNC: 24 MG/DL
CALCIUM SERPL-MCNC: 9.7 MG/DL
CHLORIDE SERPL-SCNC: 98 MMOL/L
CHOLEST SERPL-MCNC: 212 MG/DL
CK SERPL-CCNC: 133 U/L
CO2 SERPL-SCNC: 22 MMOL/L
CREAT SERPL-MCNC: 1.58 MG/DL
EGFR: 34 ML/MIN/1.73M2
EOSINOPHIL # BLD AUTO: 0.4 K/UL
EOSINOPHIL NFR BLD AUTO: 3.8 %
ESTIMATED AVERAGE GLUCOSE: 105 MG/DL
GLUCOSE SERPL-MCNC: 118 MG/DL
HBA1C MFR BLD HPLC: 5.3 %
HCT VFR BLD CALC: 33.9 %
HDLC SERPL-MCNC: 68 MG/DL
HGB BLD-MCNC: 10.9 G/DL
IMM GRANULOCYTES NFR BLD AUTO: 0.4 %
LDLC SERPL CALC-MCNC: 115 MG/DL
LYMPHOCYTES # BLD AUTO: 1.61 K/UL
LYMPHOCYTES NFR BLD AUTO: 15.4 %
MAN DIFF?: NORMAL
MCHC RBC-ENTMCNC: 32.2 G/DL
MCHC RBC-ENTMCNC: 32.5 PG
MCV RBC AUTO: 101.2 FL
MONOCYTES # BLD AUTO: 0.79 K/UL
MONOCYTES NFR BLD AUTO: 7.6 %
NEUTROPHILS # BLD AUTO: 7.5 K/UL
NEUTROPHILS NFR BLD AUTO: 71.8 %
NONHDLC SERPL-MCNC: 144 MG/DL
PLATELET # BLD AUTO: 237 K/UL
POTASSIUM SERPL-SCNC: 4.9 MMOL/L
PROT SERPL-MCNC: 7.4 G/DL
RBC # BLD: 3.35 M/UL
RBC # FLD: 12.7 %
SODIUM SERPL-SCNC: 133 MMOL/L
TRIGL SERPL-MCNC: 170 MG/DL
WBC # FLD AUTO: 10.44 K/UL

## 2024-12-10 ENCOUNTER — OUTPATIENT (OUTPATIENT)
Dept: OUTPATIENT SERVICES | Facility: HOSPITAL | Age: 74
LOS: 1 days | End: 2024-12-10
Payer: MEDICARE

## 2024-12-10 ENCOUNTER — APPOINTMENT (OUTPATIENT)
Dept: RADIOLOGY | Facility: CLINIC | Age: 74
End: 2024-12-10
Payer: MEDICARE

## 2024-12-10 DIAGNOSIS — Z96.643 PRESENCE OF ARTIFICIAL HIP JOINT, BILATERAL: Chronic | ICD-10-CM

## 2024-12-10 DIAGNOSIS — Z00.8 ENCOUNTER FOR OTHER GENERAL EXAMINATION: ICD-10-CM

## 2024-12-10 PROCEDURE — 77080 DXA BONE DENSITY AXIAL: CPT | Mod: 26

## 2024-12-10 PROCEDURE — 77080 DXA BONE DENSITY AXIAL: CPT

## 2024-12-11 ENCOUNTER — APPOINTMENT (OUTPATIENT)
Dept: ORTHOPEDIC SURGERY | Facility: CLINIC | Age: 74
End: 2024-12-11
Payer: MEDICARE

## 2024-12-11 VITALS
WEIGHT: 130 LBS | DIASTOLIC BLOOD PRESSURE: 96 MMHG | HEART RATE: 90 BPM | BODY MASS INDEX: 21.4 KG/M2 | SYSTOLIC BLOOD PRESSURE: 153 MMHG | HEIGHT: 65.5 IN

## 2024-12-11 DIAGNOSIS — S32.810A MULTIPLE FRACTURES OF PELVIS WITH STABLE DISRUPTION OF PELVIC RING, INITIAL ENCOUNTER FOR CLOSED FRACTURE: ICD-10-CM

## 2024-12-11 PROCEDURE — 99214 OFFICE O/P EST MOD 30 MIN: CPT

## 2024-12-11 PROCEDURE — 72190 X-RAY EXAM OF PELVIS: CPT

## 2025-01-29 ENCOUNTER — APPOINTMENT (OUTPATIENT)
Dept: INTERNAL MEDICINE | Facility: CLINIC | Age: 75
End: 2025-01-29
Payer: MEDICARE

## 2025-01-29 VITALS
WEIGHT: 130 LBS | SYSTOLIC BLOOD PRESSURE: 156 MMHG | TEMPERATURE: 98 F | RESPIRATION RATE: 14 BRPM | HEIGHT: 65.5 IN | HEART RATE: 77 BPM | BODY MASS INDEX: 21.4 KG/M2 | OXYGEN SATURATION: 98 % | DIASTOLIC BLOOD PRESSURE: 84 MMHG

## 2025-01-29 VITALS — SYSTOLIC BLOOD PRESSURE: 130 MMHG | DIASTOLIC BLOOD PRESSURE: 80 MMHG

## 2025-01-29 PROCEDURE — 99214 OFFICE O/P EST MOD 30 MIN: CPT

## 2025-01-31 ENCOUNTER — RX RENEWAL (OUTPATIENT)
Age: 75
End: 2025-01-31

## 2025-01-31 ENCOUNTER — EMERGENCY (EMERGENCY)
Facility: HOSPITAL | Age: 75
LOS: 0 days | Discharge: HOME | End: 2025-01-31
Attending: STUDENT IN AN ORGANIZED HEALTH CARE EDUCATION/TRAINING PROGRAM
Payer: MEDICARE

## 2025-01-31 ENCOUNTER — TRANSCRIPTION ENCOUNTER (OUTPATIENT)
Age: 75
End: 2025-01-31

## 2025-01-31 VITALS
DIASTOLIC BLOOD PRESSURE: 93 MMHG | OXYGEN SATURATION: 100 % | TEMPERATURE: 98 F | RESPIRATION RATE: 20 BRPM | SYSTOLIC BLOOD PRESSURE: 154 MMHG | HEART RATE: 72 BPM

## 2025-01-31 VITALS
OXYGEN SATURATION: 100 % | HEART RATE: 70 BPM | RESPIRATION RATE: 18 BRPM | SYSTOLIC BLOOD PRESSURE: 149 MMHG | DIASTOLIC BLOOD PRESSURE: 99 MMHG | TEMPERATURE: 98 F

## 2025-01-31 DIAGNOSIS — R07.89 OTHER CHEST PAIN: ICD-10-CM

## 2025-01-31 DIAGNOSIS — Z96.643 PRESENCE OF ARTIFICIAL HIP JOINT, BILATERAL: Chronic | ICD-10-CM

## 2025-01-31 DIAGNOSIS — I10 ESSENTIAL (PRIMARY) HYPERTENSION: ICD-10-CM

## 2025-01-31 DIAGNOSIS — E87.5 HYPERKALEMIA: ICD-10-CM

## 2025-01-31 DIAGNOSIS — Z88.8 ALLERGY STATUS TO OTHER DRUGS, MEDICAMENTS AND BIOLOGICAL SUBSTANCES: ICD-10-CM

## 2025-01-31 DIAGNOSIS — R53.83 OTHER FATIGUE: ICD-10-CM

## 2025-01-31 LAB
ALBUMIN SERPL ELPH-MCNC: 4 G/DL — SIGNIFICANT CHANGE UP (ref 3.3–5)
ALP SERPL-CCNC: 108 U/L — SIGNIFICANT CHANGE UP (ref 40–120)
ALT FLD-CCNC: 34 U/L — SIGNIFICANT CHANGE UP (ref 12–78)
ANION GAP SERPL CALC-SCNC: 10 MMOL/L
ANION GAP SERPL CALC-SCNC: 2 MMOL/L — LOW (ref 5–17)
AST SERPL-CCNC: 36 U/L — SIGNIFICANT CHANGE UP (ref 15–37)
BASOPHILS # BLD AUTO: 0.1 K/UL — SIGNIFICANT CHANGE UP (ref 0–0.2)
BASOPHILS NFR BLD AUTO: 1.3 % — SIGNIFICANT CHANGE UP (ref 0–2)
BILIRUB SERPL-MCNC: 0.4 MG/DL — SIGNIFICANT CHANGE UP (ref 0.2–1.2)
BUN SERPL-MCNC: 27 MG/DL
BUN SERPL-MCNC: 29 MG/DL — HIGH (ref 7–23)
CALCIUM SERPL-MCNC: 9.3 MG/DL
CALCIUM SERPL-MCNC: 9.4 MG/DL — SIGNIFICANT CHANGE UP (ref 8.5–10.1)
CHLORIDE SERPL-SCNC: 105 MMOL/L — SIGNIFICANT CHANGE UP (ref 96–108)
CHLORIDE SERPL-SCNC: 99 MMOL/L
CO2 SERPL-SCNC: 23 MMOL/L
CO2 SERPL-SCNC: 25 MMOL/L — SIGNIFICANT CHANGE UP (ref 22–31)
CREAT SERPL-MCNC: 1.51 MG/DL
CREAT SERPL-MCNC: 1.69 MG/DL — HIGH (ref 0.5–1.3)
EGFR: 32 ML/MIN/1.73M2 — LOW
EGFR: 36 ML/MIN/1.73M2
EOSINOPHIL # BLD AUTO: 0.44 K/UL — SIGNIFICANT CHANGE UP (ref 0–0.5)
EOSINOPHIL NFR BLD AUTO: 5.6 % — SIGNIFICANT CHANGE UP (ref 0–6)
GLUCOSE SERPL-MCNC: 101 MG/DL — HIGH (ref 70–99)
GLUCOSE SERPL-MCNC: 92 MG/DL
HCT VFR BLD CALC: 34 % — LOW (ref 34.5–45)
HGB BLD-MCNC: 11.1 G/DL — LOW (ref 11.5–15.5)
IMM GRANULOCYTES NFR BLD AUTO: 0.4 % — SIGNIFICANT CHANGE UP (ref 0–0.9)
LYMPHOCYTES # BLD AUTO: 1.83 K/UL — SIGNIFICANT CHANGE UP (ref 1–3.3)
LYMPHOCYTES # BLD AUTO: 23.3 % — SIGNIFICANT CHANGE UP (ref 13–44)
MAGNESIUM SERPL-MCNC: 2.3 MG/DL — SIGNIFICANT CHANGE UP (ref 1.6–2.6)
MCHC RBC-ENTMCNC: 32.4 PG — SIGNIFICANT CHANGE UP (ref 27–34)
MCHC RBC-ENTMCNC: 32.6 G/DL — SIGNIFICANT CHANGE UP (ref 32–36)
MCV RBC AUTO: 99.1 FL — SIGNIFICANT CHANGE UP (ref 80–100)
MONOCYTES # BLD AUTO: 0.65 K/UL — SIGNIFICANT CHANGE UP (ref 0–0.9)
MONOCYTES NFR BLD AUTO: 8.3 % — SIGNIFICANT CHANGE UP (ref 2–14)
NEUTROPHILS # BLD AUTO: 4.8 K/UL — SIGNIFICANT CHANGE UP (ref 1.8–7.4)
NEUTROPHILS NFR BLD AUTO: 61.1 % — SIGNIFICANT CHANGE UP (ref 43–77)
PLATELET # BLD AUTO: 217 K/UL — SIGNIFICANT CHANGE UP (ref 150–400)
POTASSIUM SERPL-MCNC: 5.6 MMOL/L — HIGH (ref 3.5–5.3)
POTASSIUM SERPL-SCNC: 5.6 MMOL/L — HIGH (ref 3.5–5.3)
POTASSIUM SERPL-SCNC: 6.2 MMOL/L
POTASSIUM SERPL-SCNC: 6.3 MMOL/L
PROT SERPL-MCNC: 7.8 GM/DL — SIGNIFICANT CHANGE UP (ref 6–8.3)
RBC # BLD: 3.43 M/UL — LOW (ref 3.8–5.2)
RBC # FLD: 11.8 % — SIGNIFICANT CHANGE UP (ref 10.3–14.5)
SODIUM SERPL-SCNC: 132 MMOL/L
SODIUM SERPL-SCNC: 132 MMOL/L — LOW (ref 135–145)
TROPONIN I, HIGH SENSITIVITY RESULT: 4.73 NG/L — SIGNIFICANT CHANGE UP
WBC # BLD: 7.85 K/UL — SIGNIFICANT CHANGE UP (ref 3.8–10.5)
WBC # FLD AUTO: 7.85 K/UL — SIGNIFICANT CHANGE UP (ref 3.8–10.5)

## 2025-01-31 PROCEDURE — 93010 ELECTROCARDIOGRAM REPORT: CPT

## 2025-01-31 PROCEDURE — 84484 ASSAY OF TROPONIN QUANT: CPT

## 2025-01-31 PROCEDURE — 99285 EMERGENCY DEPT VISIT HI MDM: CPT

## 2025-01-31 PROCEDURE — 93005 ELECTROCARDIOGRAM TRACING: CPT

## 2025-01-31 PROCEDURE — 83735 ASSAY OF MAGNESIUM: CPT

## 2025-01-31 PROCEDURE — 85025 COMPLETE CBC W/AUTO DIFF WBC: CPT

## 2025-01-31 PROCEDURE — 80053 COMPREHEN METABOLIC PANEL: CPT

## 2025-01-31 PROCEDURE — 36415 COLL VENOUS BLD VENIPUNCTURE: CPT

## 2025-01-31 PROCEDURE — 99283 EMERGENCY DEPT VISIT LOW MDM: CPT

## 2025-01-31 RX ORDER — SODIUM ZIRCONIUM CYCLOSILICATE 10 G/10G
10 POWDER, FOR SUSPENSION ORAL ONCE
Refills: 0 | Status: COMPLETED | OUTPATIENT
Start: 2025-01-31 | End: 2025-01-31

## 2025-01-31 RX ADMIN — SODIUM ZIRCONIUM CYCLOSILICATE 10 GRAM(S): 10 POWDER, FOR SUSPENSION ORAL at 13:17

## 2025-01-31 NOTE — ED PROVIDER NOTE - NSFOLLOWUPINSTRUCTIONS_ED_ALL_ED_FT
Please follow-up with nephrology, please call for an appointment.    Please return to the ER if develop any new or worsening symptoms.

## 2025-01-31 NOTE — ED ADULT NURSE NOTE - NSFALLUNIVINTERV_ED_ALL_ED
Bed/Stretcher in lowest position, wheels locked, appropriate side rails in place/Call bell, personal items and telephone in reach/Instruct patient to call for assistance before getting out of bed/chair/stretcher/Non-slip footwear applied when patient is off stretcher/[place] to call system/Physically safe environment - no spills, clutter or unnecessary equipment/Purposeful proactive rounding/Room/bathroom lighting operational, light cord in reach

## 2025-01-31 NOTE — ED PROVIDER NOTE - PATIENT PORTAL LINK FT
You can access the FollowMyHealth Patient Portal offered by [facility] by registering at the following website: [email or web address]. By joining [facility]’s FollowMyHealth portal, you will also be able to view your health information using other applications (apps) compatible with our system.

## 2025-01-31 NOTE — ED PROVIDER NOTE - CARE PROVIDER_API CALL
Emma Perales  Nephrology  [address] [facility], [address] [address]  Phone: [phone]  Fax: [phone]  Follow Up Time: 4-6 Days

## 2025-01-31 NOTE — ED PROVIDER NOTE - PROGRESS NOTE DETAILS
Potassium 5.6 today, no EKG changes, patient feels well, creatinine mildly worsened to 1.69.  Will give dose of Lokelma and will discharge patient to follow-up with nephrology.

## 2025-01-31 NOTE — ED PROVIDER NOTE - OBJECTIVE STATEMENT
73 y/o female w/ PMHx HTN on losartan and metoprolol presents sent in by Dr. Leon' rheumatology office c/o hyperkaliemia with a level of 6.3 yesterday. Pt endorses generalized fatigue, intermittent chest pain for the past week, and states, "I'm just not myself." Pt reports that she broke her pelvis 3mos ago as well. Allergic to scopolamine. Of note, patient states that her creatinine is normally 1.5. No other complaints at this time. 75 y/o female w/ PMHx HTN on losartan and metoprolol presents sent in by Dr. Rouse' rheumatology office c/o hyperkaliemia with a level of 6.3 yesterday. Pt endorses generalized fatigue, intermittent chest pain for the past week, and states, "I'm just not myself." Pt reports that she broke her pelvis 3mos ago as well. Allergic to scopolamine. Of note, patient states that her creatinine is normally 1.5. No other complaints at this time.

## 2025-01-31 NOTE — ED ADULT TRIAGE NOTE - CHIEF COMPLAINT QUOTE
pt ambulatory to triage sent in by dr Leon' office c/o hyperkaliemia (6.3) which was drawn yesterday. -allergies. pmh HTN

## 2025-01-31 NOTE — ED PROVIDER NOTE - NSICDXFAMILYHX_GEN_ALL_CORE_FT
FAMILY HISTORY:  Father  Still living? Unknown  Family history of kidney cancer, Age at diagnosis: Age Unknown    Mother  Still living? Unknown  Family history of kidney cancer, Age at diagnosis: Age Unknown

## 2025-01-31 NOTE — ED PROVIDER NOTE - CLINICAL SUMMARY MEDICAL DECISION MAKING FREE TEXT BOX
75 y/o female w/ PMHx HTN on losartan and metoprolol presents sent in by Dr. Rouse' rheumatology office c/o hyperkaliemia with a level of 6.3 yesterday. Pt endorses generalized fatigue, intermittent chest pain for the past week, and states.   Patient well-appearing, hemodynamically stable, EKG without changes, no signs of hyperkalemia.  Plan for repeat labs, will hold off on hyper-K cocktail.  Reassess

## 2025-01-31 NOTE — ED PROVIDER NOTE - PHYSICAL EXAMINATION
GEN: Patient awake alert NAD.   HEENT: normocephalic, atraumatic, EOMI, no scleral icterus, moist MM  CARDIAC: RRR, S1, S2, no murmur.   PULM: CTA B/L no wheeze, rhonchi, rales.   ABD: soft NT, ND, no rebound no guarding, no CVA tenderness.   MSK: Moving all extremities, no edema.  NEURO: A&Ox3, no focal neurological deficits  SKIN: warm, dry, no rash.

## 2025-01-31 NOTE — ED ADULT TRIAGE NOTE - GLASGOW COMA SCALE: EYE OPENING, MLM
- After resolution of this pregnancy I recommend follow up to discuss workup for recurrent pregnancy loss. - We reviewed her previously elevated TSH as a potential cause. WE reviewed her insulin resistance and pre-diabetes which may also align with PCOS. - Recommend APLS labs, TSH, testosterone level, after resolution of this pregnancy. (E4) spontaneous

## 2025-01-31 NOTE — ED ADULT NURSE NOTE - OBJECTIVE STATEMENT
Pt is a 73 yo female who presents to the ED for an abnormal lab result. Pt AOX4 and ambulatory. Pt had routine blood work done and was found to have a potasium of 6.3. Pt endorses intermittent chest pain and numbness/tingling in her hands and feet. Pt denies sob, dizziness, blurred vision, vomiting/diarrhea. Pt endorses being more tired than usual.  Pt placed on cardiac monitor, ECG completed. 20g IV placed in the right AC, labs drawn and sent . Pending results.

## 2025-02-03 ENCOUNTER — APPOINTMENT (OUTPATIENT)
Dept: INTERNAL MEDICINE | Facility: CLINIC | Age: 75
End: 2025-02-03
Payer: MEDICARE

## 2025-02-03 VITALS
BODY MASS INDEX: 21.4 KG/M2 | WEIGHT: 130 LBS | TEMPERATURE: 97.8 F | DIASTOLIC BLOOD PRESSURE: 74 MMHG | HEIGHT: 65.5 IN | SYSTOLIC BLOOD PRESSURE: 140 MMHG | HEART RATE: 66 BPM | OXYGEN SATURATION: 99 % | RESPIRATION RATE: 14 BRPM

## 2025-02-03 VITALS — DIASTOLIC BLOOD PRESSURE: 70 MMHG | SYSTOLIC BLOOD PRESSURE: 160 MMHG

## 2025-02-03 DIAGNOSIS — E87.5 HYPERKALEMIA: ICD-10-CM

## 2025-02-03 PROBLEM — N18.30 CKD (CHRONIC KIDNEY DISEASE), STAGE III: Status: ACTIVE | Noted: 2025-01-31

## 2025-02-03 PROCEDURE — 99214 OFFICE O/P EST MOD 30 MIN: CPT

## 2025-02-06 LAB
ANION GAP SERPL CALC-SCNC: 11 MMOL/L
BUN SERPL-MCNC: 25 MG/DL
CALCIUM SERPL-MCNC: 9.5 MG/DL
CHLORIDE SERPL-SCNC: 98 MMOL/L
CO2 SERPL-SCNC: 24 MMOL/L
CREAT SERPL-MCNC: 1.57 MG/DL
EGFR: 35 ML/MIN/1.73M2
GLUCOSE SERPL-MCNC: 96 MG/DL
POTASSIUM SERPL-SCNC: 5.3 MMOL/L
POTASSIUM SERPL-SCNC: 5.3 MMOL/L
SODIUM SERPL-SCNC: 133 MMOL/L

## 2025-02-10 ENCOUNTER — APPOINTMENT (OUTPATIENT)
Dept: INTERNAL MEDICINE | Facility: CLINIC | Age: 75
End: 2025-02-10
Payer: MEDICARE

## 2025-02-10 VITALS
OXYGEN SATURATION: 98 % | TEMPERATURE: 98 F | RESPIRATION RATE: 14 BRPM | WEIGHT: 130 LBS | SYSTOLIC BLOOD PRESSURE: 144 MMHG | DIASTOLIC BLOOD PRESSURE: 70 MMHG | HEART RATE: 83 BPM | HEIGHT: 65.5 IN | BODY MASS INDEX: 21.4 KG/M2

## 2025-02-10 VITALS — DIASTOLIC BLOOD PRESSURE: 70 MMHG | SYSTOLIC BLOOD PRESSURE: 140 MMHG

## 2025-02-10 DIAGNOSIS — I10 ESSENTIAL (PRIMARY) HYPERTENSION: ICD-10-CM

## 2025-02-10 DIAGNOSIS — N18.30 CHRONIC KIDNEY DISEASE, STAGE 3 UNSPECIFIED: ICD-10-CM

## 2025-02-10 PROCEDURE — 99214 OFFICE O/P EST MOD 30 MIN: CPT

## 2025-02-11 LAB
ANION GAP SERPL CALC-SCNC: 12 MMOL/L
BUN SERPL-MCNC: 31 MG/DL
CALCIUM SERPL-MCNC: 9.5 MG/DL
CHLORIDE SERPL-SCNC: 101 MMOL/L
CO2 SERPL-SCNC: 23 MMOL/L
CREAT SERPL-MCNC: 1.62 MG/DL
EGFR: 33 ML/MIN/1.73M2
GLUCOSE SERPL-MCNC: 127 MG/DL
POTASSIUM SERPL-SCNC: 5.3 MMOL/L
SODIUM SERPL-SCNC: 136 MMOL/L

## 2025-02-24 ENCOUNTER — APPOINTMENT (OUTPATIENT)
Dept: CARDIOLOGY | Facility: CLINIC | Age: 75
End: 2025-02-24
Payer: MEDICARE

## 2025-02-24 VITALS
BODY MASS INDEX: 21.4 KG/M2 | WEIGHT: 130 LBS | SYSTOLIC BLOOD PRESSURE: 137 MMHG | HEART RATE: 69 BPM | DIASTOLIC BLOOD PRESSURE: 74 MMHG | OXYGEN SATURATION: 99 % | HEIGHT: 65.5 IN

## 2025-02-24 DIAGNOSIS — R06.09 OTHER FORMS OF DYSPNEA: ICD-10-CM

## 2025-02-24 DIAGNOSIS — I47.10 SUPRAVENTRICULAR TACHYCARDIA, UNSPECIFIED: ICD-10-CM

## 2025-02-24 DIAGNOSIS — I10 ESSENTIAL (PRIMARY) HYPERTENSION: ICD-10-CM

## 2025-02-24 DIAGNOSIS — I25.10 ATHEROSCLEROTIC HEART DISEASE OF NATIVE CORONARY ARTERY W/OUT ANGINA PECTORIS: ICD-10-CM

## 2025-02-24 PROCEDURE — 99215 OFFICE O/P EST HI 40 MIN: CPT

## 2025-02-24 PROCEDURE — G2211 COMPLEX E/M VISIT ADD ON: CPT

## 2025-02-24 PROCEDURE — 93000 ELECTROCARDIOGRAM COMPLETE: CPT

## 2025-03-11 NOTE — END OF VISIT
Lab Results   Component Value Date    HGBA1C 7.4 (H) 12/12/2024   Continue Amaryl 2 mg twice daily  Continue Farxiga 5 mg daily (auto substituted for home dose of Jardiance while at rehab)  Encourage diabetic diet  Avoid hypoglycemia  Monitor A1c   [Time Spent: ___ minutes] : I have spent [unfilled] minutes of time on the encounter which excludes teaching and separately reported services.

## 2025-03-19 ENCOUNTER — APPOINTMENT (OUTPATIENT)
Dept: NEPHROLOGY | Facility: CLINIC | Age: 75
End: 2025-03-19

## 2025-03-25 ENCOUNTER — APPOINTMENT (OUTPATIENT)
Dept: CARDIOLOGY | Facility: CLINIC | Age: 75
End: 2025-03-25
Payer: MEDICARE

## 2025-03-25 ENCOUNTER — APPOINTMENT (OUTPATIENT)
Dept: INTERNAL MEDICINE | Facility: CLINIC | Age: 75
End: 2025-03-25
Payer: MEDICARE

## 2025-03-25 VITALS
RESPIRATION RATE: 14 BRPM | HEIGHT: 65.5 IN | OXYGEN SATURATION: 98 % | SYSTOLIC BLOOD PRESSURE: 146 MMHG | DIASTOLIC BLOOD PRESSURE: 78 MMHG | HEART RATE: 83 BPM | TEMPERATURE: 98.1 F

## 2025-03-25 DIAGNOSIS — I10 ESSENTIAL (PRIMARY) HYPERTENSION: ICD-10-CM

## 2025-03-25 DIAGNOSIS — N18.30 CHRONIC KIDNEY DISEASE, STAGE 3 UNSPECIFIED: ICD-10-CM

## 2025-03-25 PROCEDURE — 93015 CV STRESS TEST SUPVJ I&R: CPT

## 2025-03-25 PROCEDURE — 93306 TTE W/DOPPLER COMPLETE: CPT

## 2025-03-25 PROCEDURE — 99214 OFFICE O/P EST MOD 30 MIN: CPT

## 2025-03-27 LAB
25(OH)D3 SERPL-MCNC: 54.9 NG/ML
ALBUMIN SERPL ELPH-MCNC: 4.5 G/DL
ALP BLD-CCNC: 99 U/L
ALT SERPL-CCNC: 17 U/L
ANION GAP SERPL CALC-SCNC: 11 MMOL/L
AST SERPL-CCNC: 21 U/L
BASOPHILS # BLD AUTO: 0.09 K/UL
BASOPHILS NFR BLD AUTO: 1.1 %
BILIRUB SERPL-MCNC: 0.2 MG/DL
BUN SERPL-MCNC: 32 MG/DL
CALCIUM SERPL-MCNC: 9.3 MG/DL
CHLORIDE SERPL-SCNC: 104 MMOL/L
CHOLEST SERPL-MCNC: 223 MG/DL
CK SERPL-CCNC: 149 U/L
CO2 SERPL-SCNC: 23 MMOL/L
CREAT SERPL-MCNC: 1.7 MG/DL
EGFRCR SERPLBLD CKD-EPI 2021: 31 ML/MIN/1.73M2
EOSINOPHIL # BLD AUTO: 0.59 K/UL
EOSINOPHIL NFR BLD AUTO: 7 %
GLUCOSE SERPL-MCNC: 101 MG/DL
HCT VFR BLD CALC: 32.4 %
HDLC SERPL-MCNC: 64 MG/DL
HGB BLD-MCNC: 10.3 G/DL
IMM GRANULOCYTES NFR BLD AUTO: 0.4 %
LDLC SERPL-MCNC: 133 MG/DL
LYMPHOCYTES # BLD AUTO: 2.78 K/UL
LYMPHOCYTES NFR BLD AUTO: 33.2 %
MAN DIFF?: NORMAL
MCHC RBC-ENTMCNC: 31.4 PG
MCHC RBC-ENTMCNC: 31.8 G/DL
MCV RBC AUTO: 98.8 FL
MONOCYTES # BLD AUTO: 0.75 K/UL
MONOCYTES NFR BLD AUTO: 9 %
NEUTROPHILS # BLD AUTO: 4.13 K/UL
NEUTROPHILS NFR BLD AUTO: 49.3 %
NONHDLC SERPL-MCNC: 158 MG/DL
PLATELET # BLD AUTO: 225 K/UL
POTASSIUM SERPL-SCNC: 5.1 MMOL/L
PROT SERPL-MCNC: 7 G/DL
RBC # BLD: 3.28 M/UL
RBC # FLD: 12.3 %
SODIUM SERPL-SCNC: 138 MMOL/L
TRIGL SERPL-MCNC: 146 MG/DL
TSH SERPL-ACNC: 1.6 UIU/ML
WBC # FLD AUTO: 8.37 K/UL

## 2025-04-28 ENCOUNTER — APPOINTMENT (OUTPATIENT)
Dept: INTERNAL MEDICINE | Facility: CLINIC | Age: 75
End: 2025-04-28
Payer: MEDICARE

## 2025-04-28 VITALS
OXYGEN SATURATION: 99 % | TEMPERATURE: 97.5 F | RESPIRATION RATE: 15 BRPM | HEART RATE: 83 BPM | DIASTOLIC BLOOD PRESSURE: 64 MMHG | HEIGHT: 65.5 IN | SYSTOLIC BLOOD PRESSURE: 120 MMHG

## 2025-04-28 DIAGNOSIS — N28.9 DISORDER OF KIDNEY AND URETER, UNSPECIFIED: ICD-10-CM

## 2025-04-28 DIAGNOSIS — I10 ESSENTIAL (PRIMARY) HYPERTENSION: ICD-10-CM

## 2025-04-28 DIAGNOSIS — N18.30 CHRONIC KIDNEY DISEASE, STAGE 3 UNSPECIFIED: ICD-10-CM

## 2025-04-28 PROCEDURE — 99214 OFFICE O/P EST MOD 30 MIN: CPT

## 2025-04-30 LAB
25(OH)D3 SERPL-MCNC: 55.8 NG/ML
ALBUMIN SERPL ELPH-MCNC: 4.6 G/DL
ALP BLD-CCNC: 94 U/L
ALT SERPL-CCNC: 24 U/L
ANION GAP SERPL CALC-SCNC: 11 MMOL/L
AST SERPL-CCNC: 27 U/L
BASOPHILS # BLD AUTO: 0.09 K/UL
BASOPHILS NFR BLD AUTO: 1.2 %
BILIRUB SERPL-MCNC: 0.2 MG/DL
BUN SERPL-MCNC: 25 MG/DL
CALCIUM SERPL-MCNC: 9.5 MG/DL
CHLORIDE SERPL-SCNC: 100 MMOL/L
CHOLEST SERPL-MCNC: 225 MG/DL
CK SERPL-CCNC: 157 U/L
CO2 SERPL-SCNC: 24 MMOL/L
CREAT SERPL-MCNC: 1.52 MG/DL
EGFRCR SERPLBLD CKD-EPI 2021: 36 ML/MIN/1.73M2
EOSINOPHIL # BLD AUTO: 0.53 K/UL
EOSINOPHIL NFR BLD AUTO: 7.1 %
ESTIMATED AVERAGE GLUCOSE: 123 MG/DL
GLUCOSE SERPL-MCNC: 91 MG/DL
HBA1C MFR BLD HPLC: 5.9 %
HCT VFR BLD CALC: 32.5 %
HDLC SERPL-MCNC: 62 MG/DL
HGB BLD-MCNC: 10.7 G/DL
IMM GRANULOCYTES NFR BLD AUTO: 0.3 %
LDLC SERPL-MCNC: 139 MG/DL
LYMPHOCYTES # BLD AUTO: 2.55 K/UL
LYMPHOCYTES NFR BLD AUTO: 34.2 %
MAN DIFF?: NORMAL
MCHC RBC-ENTMCNC: 32.6 PG
MCHC RBC-ENTMCNC: 32.9 G/DL
MCV RBC AUTO: 99.1 FL
MONOCYTES # BLD AUTO: 0.74 K/UL
MONOCYTES NFR BLD AUTO: 9.9 %
NEUTROPHILS # BLD AUTO: 3.52 K/UL
NEUTROPHILS NFR BLD AUTO: 47.3 %
NONHDLC SERPL-MCNC: 163 MG/DL
PLATELET # BLD AUTO: 208 K/UL
POTASSIUM SERPL-SCNC: 5.6 MMOL/L
PROT SERPL-MCNC: 7.4 G/DL
RBC # BLD: 3.28 M/UL
RBC # FLD: 12.2 %
SODIUM SERPL-SCNC: 134 MMOL/L
TRIGL SERPL-MCNC: 136 MG/DL
TSH SERPL-ACNC: 2.14 UIU/ML
WBC # FLD AUTO: 7.45 K/UL

## 2025-05-01 LAB — POTASSIUM SERPL-SCNC: 5.5 MMOL/L

## 2025-05-01 RX ORDER — SODIUM ZIRCONIUM CYCLOSILICATE 5 G/5G
5 POWDER, FOR SUSPENSION ORAL WEEKLY
Qty: 4 | Refills: 5 | Status: ACTIVE | COMMUNITY
Start: 2025-05-01 | End: 1900-01-01

## 2025-05-28 ENCOUNTER — APPOINTMENT (OUTPATIENT)
Dept: CARDIOLOGY | Facility: CLINIC | Age: 75
End: 2025-05-28
Payer: MEDICARE

## 2025-05-28 VITALS
HEART RATE: 72 BPM | OXYGEN SATURATION: 97 % | WEIGHT: 130 LBS | BODY MASS INDEX: 21.4 KG/M2 | HEIGHT: 65.5 IN | DIASTOLIC BLOOD PRESSURE: 78 MMHG | SYSTOLIC BLOOD PRESSURE: 144 MMHG

## 2025-05-28 DIAGNOSIS — I25.10 ATHEROSCLEROTIC HEART DISEASE OF NATIVE CORONARY ARTERY W/OUT ANGINA PECTORIS: ICD-10-CM

## 2025-05-28 DIAGNOSIS — I47.10 SUPRAVENTRICULAR TACHYCARDIA, UNSPECIFIED: ICD-10-CM

## 2025-05-28 DIAGNOSIS — R06.09 OTHER FORMS OF DYSPNEA: ICD-10-CM

## 2025-05-28 DIAGNOSIS — E78.5 HYPERLIPIDEMIA, UNSPECIFIED: ICD-10-CM

## 2025-05-28 PROCEDURE — 99215 OFFICE O/P EST HI 40 MIN: CPT

## 2025-05-28 PROCEDURE — G2211 COMPLEX E/M VISIT ADD ON: CPT

## 2025-05-28 PROCEDURE — 93000 ELECTROCARDIOGRAM COMPLETE: CPT

## 2025-05-28 RX ORDER — ROSUVASTATIN CALCIUM 5 MG/1
5 TABLET, FILM COATED ORAL
Qty: 90 | Refills: 3 | Status: ACTIVE | COMMUNITY
Start: 2025-05-28 | End: 1900-01-01

## 2025-06-24 ENCOUNTER — RX RENEWAL (OUTPATIENT)
Age: 75
End: 2025-06-24

## 2025-07-09 ENCOUNTER — APPOINTMENT (OUTPATIENT)
Dept: INTERNAL MEDICINE | Facility: CLINIC | Age: 75
End: 2025-07-09
Payer: MEDICARE

## 2025-07-09 VITALS
HEIGHT: 65.5 IN | SYSTOLIC BLOOD PRESSURE: 130 MMHG | DIASTOLIC BLOOD PRESSURE: 72 MMHG | RESPIRATION RATE: 14 BRPM | HEART RATE: 63 BPM | OXYGEN SATURATION: 97 % | TEMPERATURE: 98 F

## 2025-07-09 PROCEDURE — 99214 OFFICE O/P EST MOD 30 MIN: CPT

## 2025-07-09 PROCEDURE — G2211 COMPLEX E/M VISIT ADD ON: CPT

## 2025-07-13 LAB
25(OH)D3 SERPL-MCNC: 53.8 NG/ML
ALBUMIN SERPL ELPH-MCNC: 4.7 G/DL
ALP BLD-CCNC: 90 U/L
ALT SERPL-CCNC: 20 U/L
ANION GAP SERPL CALC-SCNC: 13 MMOL/L
AST SERPL-CCNC: 27 U/L
BASOPHILS # BLD AUTO: 0.1 K/UL
BASOPHILS NFR BLD AUTO: 1.2 %
BILIRUB SERPL-MCNC: 0.2 MG/DL
BUN SERPL-MCNC: 23 MG/DL
CALCIUM SERPL-MCNC: 9.7 MG/DL
CHLORIDE SERPL-SCNC: 100 MMOL/L
CHOLEST SERPL-MCNC: 199 MG/DL
CK SERPL-CCNC: 234 U/L
CO2 SERPL-SCNC: 21 MMOL/L
CREAT SERPL-MCNC: 1.61 MG/DL
EGFRCR SERPLBLD CKD-EPI 2021: 34 ML/MIN/1.73M2
EOSINOPHIL # BLD AUTO: 0.6 K/UL
EOSINOPHIL NFR BLD AUTO: 7.5 %
GLUCOSE SERPL-MCNC: 93 MG/DL
HCT VFR BLD CALC: 31.4 %
HDLC SERPL-MCNC: 68 MG/DL
HGB BLD-MCNC: 10.4 G/DL
IMM GRANULOCYTES NFR BLD AUTO: 0.1 %
LDLC SERPL-MCNC: 110 MG/DL
LYMPHOCYTES # BLD AUTO: 2.77 K/UL
LYMPHOCYTES NFR BLD AUTO: 34.4 %
MAN DIFF?: NORMAL
MCHC RBC-ENTMCNC: 32.4 PG
MCHC RBC-ENTMCNC: 33.1 G/DL
MCV RBC AUTO: 97.8 FL
MONOCYTES # BLD AUTO: 0.82 K/UL
MONOCYTES NFR BLD AUTO: 10.2 %
NEUTROPHILS # BLD AUTO: 3.75 K/UL
NEUTROPHILS NFR BLD AUTO: 46.6 %
NONHDLC SERPL-MCNC: 131 MG/DL
PLATELET # BLD AUTO: 206 K/UL
POTASSIUM SERPL-SCNC: 5.4 MMOL/L
PROT SERPL-MCNC: 7.6 G/DL
RBC # BLD: 3.21 M/UL
RBC # FLD: 11.8 %
SODIUM SERPL-SCNC: 134 MMOL/L
TRIGL SERPL-MCNC: 118 MG/DL
TSH SERPL-ACNC: 1.59 UIU/ML
WBC # FLD AUTO: 8.05 K/UL

## 2025-09-09 ENCOUNTER — APPOINTMENT (OUTPATIENT)
Dept: INTERNAL MEDICINE | Facility: CLINIC | Age: 75
End: 2025-09-09
Payer: MEDICARE

## 2025-09-09 VITALS
OXYGEN SATURATION: 99 % | WEIGHT: 130 LBS | DIASTOLIC BLOOD PRESSURE: 70 MMHG | TEMPERATURE: 98.1 F | HEART RATE: 72 BPM | SYSTOLIC BLOOD PRESSURE: 120 MMHG | BODY MASS INDEX: 21.66 KG/M2 | HEIGHT: 65 IN

## 2025-09-09 DIAGNOSIS — I10 ESSENTIAL (PRIMARY) HYPERTENSION: ICD-10-CM

## 2025-09-09 DIAGNOSIS — E87.5 HYPERKALEMIA: ICD-10-CM

## 2025-09-09 DIAGNOSIS — R93.0 ABNORMAL FINDINGS ON DIAGNOSTIC IMAGING OF SKULL AND HEAD, NOT ELSEWHERE CLASSIFIED: ICD-10-CM

## 2025-09-09 DIAGNOSIS — N18.30 CHRONIC KIDNEY DISEASE, STAGE 3 UNSPECIFIED: ICD-10-CM

## 2025-09-09 PROCEDURE — 36415 COLL VENOUS BLD VENIPUNCTURE: CPT

## 2025-09-09 PROCEDURE — G2211 COMPLEX E/M VISIT ADD ON: CPT

## 2025-09-09 PROCEDURE — 99214 OFFICE O/P EST MOD 30 MIN: CPT

## 2025-09-10 LAB
25(OH)D3 SERPL-MCNC: 49.5 NG/ML
ALBUMIN SERPL ELPH-MCNC: 4.7 G/DL
ALP BLD-CCNC: 86 U/L
ALT SERPL-CCNC: 27 U/L
ANION GAP SERPL CALC-SCNC: 13 MMOL/L
AST SERPL-CCNC: 29 U/L
BASOPHILS # BLD AUTO: 0.1 K/UL
BASOPHILS NFR BLD AUTO: 1.2 %
BILIRUB SERPL-MCNC: 0.2 MG/DL
BUN SERPL-MCNC: 23 MG/DL
CALCIUM SERPL-MCNC: 9.6 MG/DL
CHLORIDE SERPL-SCNC: 102 MMOL/L
CHOLEST SERPL-MCNC: 207 MG/DL
CK SERPL-CCNC: 176 U/L
CO2 SERPL-SCNC: 22 MMOL/L
CREAT SERPL-MCNC: 1.59 MG/DL
EGFRCR SERPLBLD CKD-EPI 2021: 34 ML/MIN/1.73M2
EOSINOPHIL # BLD AUTO: 0.6 K/UL
EOSINOPHIL NFR BLD AUTO: 7.4 %
ESTIMATED AVERAGE GLUCOSE: 120 MG/DL
GLUCOSE SERPL-MCNC: 101 MG/DL
HBA1C MFR BLD HPLC: 5.8 %
HCT VFR BLD CALC: 32.8 %
HDLC SERPL-MCNC: 54 MG/DL
HGB BLD-MCNC: 10.4 G/DL
IMM GRANULOCYTES NFR BLD AUTO: 0.4 %
LDLC SERPL-MCNC: 123 MG/DL
LYMPHOCYTES # BLD AUTO: 2.44 K/UL
LYMPHOCYTES NFR BLD AUTO: 30 %
MAN DIFF?: NORMAL
MCHC RBC-ENTMCNC: 31.7 G/DL
MCHC RBC-ENTMCNC: 31.8 PG
MCV RBC AUTO: 100.3 FL
MONOCYTES # BLD AUTO: 0.82 K/UL
MONOCYTES NFR BLD AUTO: 10.1 %
NEUTROPHILS # BLD AUTO: 4.15 K/UL
NEUTROPHILS NFR BLD AUTO: 50.9 %
NONHDLC SERPL-MCNC: 153 MG/DL
PLATELET # BLD AUTO: 216 K/UL
POTASSIUM SERPL-SCNC: 5.2 MMOL/L
PROT SERPL-MCNC: 7.5 G/DL
RBC # BLD: 3.27 M/UL
RBC # FLD: 11.9 %
SODIUM SERPL-SCNC: 138 MMOL/L
TRIGL SERPL-MCNC: 170 MG/DL
TSH SERPL-ACNC: 1.55 UIU/ML
WBC # FLD AUTO: 8.14 K/UL

## 2025-09-15 ENCOUNTER — RX RENEWAL (OUTPATIENT)
Age: 75
End: 2025-09-15